# Patient Record
Sex: MALE | Race: WHITE | NOT HISPANIC OR LATINO | Employment: FULL TIME | URBAN - METROPOLITAN AREA
[De-identification: names, ages, dates, MRNs, and addresses within clinical notes are randomized per-mention and may not be internally consistent; named-entity substitution may affect disease eponyms.]

---

## 2023-02-28 LAB — PROSTATE SPECIFIC AG (NG/ML) IN SER/PLAS: 2.8 NG/ML (ref 0–4)

## 2023-03-22 ENCOUNTER — PATIENT OUTREACH (OUTPATIENT)
Dept: CARE COORDINATION | Facility: CLINIC | Age: 63
End: 2023-03-22

## 2023-04-18 ENCOUNTER — OFFICE VISIT (OUTPATIENT)
Dept: PRIMARY CARE | Facility: CLINIC | Age: 63
End: 2023-04-18
Payer: COMMERCIAL

## 2023-04-18 VITALS
TEMPERATURE: 98 F | HEIGHT: 71 IN | OXYGEN SATURATION: 96 % | DIASTOLIC BLOOD PRESSURE: 62 MMHG | SYSTOLIC BLOOD PRESSURE: 122 MMHG | WEIGHT: 315 LBS | HEART RATE: 64 BPM | BODY MASS INDEX: 44.1 KG/M2 | RESPIRATION RATE: 18 BRPM

## 2023-04-18 DIAGNOSIS — K21.9 GASTROESOPHAGEAL REFLUX DISEASE, UNSPECIFIED WHETHER ESOPHAGITIS PRESENT: ICD-10-CM

## 2023-04-18 DIAGNOSIS — E78.5 HYPERLIPIDEMIA, UNSPECIFIED HYPERLIPIDEMIA TYPE: ICD-10-CM

## 2023-04-18 DIAGNOSIS — G47.33 OSA ON CPAP: ICD-10-CM

## 2023-04-18 DIAGNOSIS — F32.A ANXIETY AND DEPRESSION: ICD-10-CM

## 2023-04-18 DIAGNOSIS — F41.9 ANXIETY AND DEPRESSION: ICD-10-CM

## 2023-04-18 DIAGNOSIS — Z79.4 TYPE 2 DIABETES MELLITUS WITH DIABETIC POLYNEUROPATHY, WITH LONG-TERM CURRENT USE OF INSULIN (MULTI): Primary | ICD-10-CM

## 2023-04-18 DIAGNOSIS — I48.91 ATRIAL FIBRILLATION, UNSPECIFIED TYPE (MULTI): ICD-10-CM

## 2023-04-18 DIAGNOSIS — E66.01 MORBID OBESITY (MULTI): ICD-10-CM

## 2023-04-18 DIAGNOSIS — I10 HYPERTENSION, UNSPECIFIED TYPE: ICD-10-CM

## 2023-04-18 DIAGNOSIS — E11.42 TYPE 2 DIABETES MELLITUS WITH DIABETIC POLYNEUROPATHY, WITH LONG-TERM CURRENT USE OF INSULIN (MULTI): Primary | ICD-10-CM

## 2023-04-18 PROBLEM — E61.1 IRON DEFICIENCY: Status: ACTIVE | Noted: 2023-04-18

## 2023-04-18 PROBLEM — D64.9 ANEMIA: Status: ACTIVE | Noted: 2023-04-18

## 2023-04-18 PROBLEM — E55.9 VITAMIN D DEFICIENCY: Status: ACTIVE | Noted: 2023-04-18

## 2023-04-18 PROBLEM — E11.9 T2DM (TYPE 2 DIABETES MELLITUS) (MULTI): Status: ACTIVE | Noted: 2023-04-18

## 2023-04-18 PROBLEM — Q53.9 UNDESCENDED TESTICLE: Status: ACTIVE | Noted: 2023-04-18

## 2023-04-18 PROBLEM — I50.9 CHF (CONGESTIVE HEART FAILURE) (MULTI): Status: ACTIVE | Noted: 2023-04-18

## 2023-04-18 PROBLEM — M79.89 MASS OF SOFT TISSUE OF NECK: Status: ACTIVE | Noted: 2023-04-18

## 2023-04-18 PROBLEM — Z98.84 STATUS POST GASTRIC BYPASS FOR OBESITY: Status: ACTIVE | Noted: 2023-04-18

## 2023-04-18 PROBLEM — M79.89 MASS OF SOFT TISSUE OF NECK: Status: RESOLVED | Noted: 2023-04-18 | Resolved: 2023-04-18

## 2023-04-18 PROBLEM — E87.6 HYPOKALEMIA: Status: ACTIVE | Noted: 2023-04-18

## 2023-04-18 PROBLEM — R79.89 ELEVATED PARATHYROID HORMONE: Status: ACTIVE | Noted: 2023-04-18

## 2023-04-18 PROCEDURE — 3078F DIAST BP <80 MM HG: CPT | Performed by: FAMILY MEDICINE

## 2023-04-18 PROCEDURE — 99214 OFFICE O/P EST MOD 30 MIN: CPT | Performed by: FAMILY MEDICINE

## 2023-04-18 PROCEDURE — 3074F SYST BP LT 130 MM HG: CPT | Performed by: FAMILY MEDICINE

## 2023-04-18 PROCEDURE — 4010F ACE/ARB THERAPY RXD/TAKEN: CPT | Performed by: FAMILY MEDICINE

## 2023-04-18 PROCEDURE — 1036F TOBACCO NON-USER: CPT | Performed by: FAMILY MEDICINE

## 2023-04-18 RX ORDER — TORSEMIDE 20 MG/1
60 TABLET ORAL DAILY
Qty: 270 TABLET | Refills: 3 | Status: SHIPPED | COMMUNITY
Start: 2023-04-18 | End: 2023-07-31 | Stop reason: SDUPTHER

## 2023-04-18 RX ORDER — METFORMIN HYDROCHLORIDE 1000 MG/1
TABLET ORAL
COMMUNITY
Start: 2019-09-06 | End: 2023-07-31

## 2023-04-18 RX ORDER — WARFARIN SODIUM 5 MG/1
TABLET ORAL
COMMUNITY
Start: 2023-04-11

## 2023-04-18 RX ORDER — SOTALOL HYDROCHLORIDE 80 MG/1
40 TABLET ORAL 2 TIMES DAILY
COMMUNITY
Start: 2023-04-12 | End: 2024-04-01 | Stop reason: ALTCHOICE

## 2023-04-18 RX ORDER — SITAGLIPTIN 100 MG/1
100 TABLET, FILM COATED ORAL DAILY
COMMUNITY
Start: 2021-07-16 | End: 2024-01-30 | Stop reason: ENTERED-IN-ERROR

## 2023-04-18 RX ORDER — ACETAMINOPHEN 160 MG/5ML
200 SUSPENSION, ORAL (FINAL DOSE FORM) ORAL DAILY
COMMUNITY

## 2023-04-18 RX ORDER — TORSEMIDE 20 MG/1
20 TABLET ORAL DAILY
COMMUNITY
Start: 2019-09-06 | End: 2023-04-18 | Stop reason: DRUGHIGH

## 2023-04-18 RX ORDER — LISINOPRIL 20 MG/1
TABLET ORAL
COMMUNITY
Start: 2023-03-11 | End: 2023-07-31 | Stop reason: SDUPTHER

## 2023-04-18 RX ORDER — SPIRONOLACTONE 25 MG/1
0.5 TABLET ORAL DAILY
COMMUNITY
Start: 2019-09-06 | End: 2023-07-31 | Stop reason: DRUGHIGH

## 2023-04-18 RX ORDER — SIMVASTATIN 40 MG/1
40 TABLET, FILM COATED ORAL NIGHTLY
COMMUNITY
End: 2023-12-15

## 2023-04-18 RX ORDER — ASCORBIC ACID 500 MG
TABLET,CHEWABLE ORAL
COMMUNITY
End: 2023-12-15

## 2023-04-18 RX ORDER — PEDI MULTIVIT NO.25/FOLIC ACID 300 MCG
TABLET,CHEWABLE ORAL
COMMUNITY
End: 2023-12-15

## 2023-04-18 RX ORDER — INSULIN GLARGINE 100 [IU]/ML
34 INJECTION, SOLUTION SUBCUTANEOUS NIGHTLY
COMMUNITY
Start: 2021-07-13 | End: 2023-10-09 | Stop reason: SDUPTHER

## 2023-04-18 RX ORDER — PANTOPRAZOLE SODIUM 40 MG/1
1 TABLET, DELAYED RELEASE ORAL DAILY
COMMUNITY
Start: 2019-08-19 | End: 2023-04-25

## 2023-04-18 RX ORDER — EMPAGLIFLOZIN 25 MG/1
25 TABLET, FILM COATED ORAL DAILY
COMMUNITY
Start: 2019-09-06 | End: 2023-06-15

## 2023-04-18 RX ORDER — POTASSIUM CHLORIDE 750 MG/1
TABLET, EXTENDED RELEASE ORAL
COMMUNITY
Start: 2019-10-16 | End: 2023-05-03

## 2023-04-18 RX ORDER — FLUOXETINE HYDROCHLORIDE 40 MG/1
80 CAPSULE ORAL DAILY
COMMUNITY

## 2023-04-18 ASSESSMENT — ENCOUNTER SYMPTOMS
SHORTNESS OF BREATH: 0
HEADACHES: 0
DIZZINESS: 0

## 2023-04-18 NOTE — PROGRESS NOTES
"Subjective     Allen Sierra is a 62 y.o. male who presents for Hospital Follow-up (Heart cath and maze. Feeling okay, still a little tender from the surgery. ).    HPI     Pt is here today for [facility] follow up s/p convergent MAZE procedure on 3/17/23 with Dr. Ramos, [facility] cardio.  He recently had outpatient follow up with EP NP Erendira Rivera on 3/29 and was found to be in atrial fibrillation.  He is on coumadin due to CHADS VASc score of 3.  He has CHF, on lisinopril, aldactone, jardiance.  He is on sotolol for rhythm due to his atrial fib which was started in Jan 2023.  Stage II convergent MAZE planned but not scheduled yet.  He goes to coumadin clinic through [facility].     He will be seeing ENT and audiology tomorrow.  He recently had COVID in Dec. 2022 , then sinus infection which was treated.  His ears have been bothering him since COVID infection. He feels pressure sensation both ears,right worse than left.       Review of Systems   Eyes:  Negative for visual disturbance.   Respiratory:  Negative for shortness of breath.    Cardiovascular:  Negative for chest pain.   Neurological:  Negative for dizziness and headaches.       Objective     Vitals:    04/18/23 1014   BP: 122/62   Pulse: 64   Resp: 18   Temp: 36.7 °C (98 °F)   TempSrc: Temporal   SpO2: 96%   Weight: (!) 154 kg (340 lb)   Height: 1.803 m (5' 11\")        Current Outpatient Medications   Medication Instructions    ascorbic acid (Vitamin C) 500 mg chewable tablet oral    CALCIUM CARBONATE-VITAMIN D3 ORAL oral    coenzyme Q-10 200 mg, oral, Daily    FLUoxetine (PROZAC) 40 mg, oral, 2 times daily    Januvia 100 mg, oral, Daily    Jardiance 25 mg, oral, Daily    Lantus Solostar U-100 Insulin 34 Units, subcutaneous, Nightly    lisinopril 20 mg tablet     metFORMIN (Glucophage) 1,000 mg tablet metformin 1,000 mg tablet    pantoprazole (ProtoNix) 40 mg EC tablet 1 tablet, oral, Daily    pedi multivit no.25-folic acid (Flintstones Multivitamin) 300 mcg " tablet,chewable oral    potassium chloride CR 10 mEq ER tablet     simvastatin (ZOCOR) 40 mg, oral, Nightly    sotalol (BETAPACE) 40 mg, oral, 2 times daily    spironolactone (ALDACTONE) 25 mg, oral, Daily    torsemide (DEMADEX) 60 mg, oral, Daily    warfarin (COUMADIN) 5 mg, oral        Past Surgical History:   Procedure Laterality Date    OTHER SURGICAL HISTORY  06/25/2019    Rotator cuff repair    OTHER SURGICAL HISTORY  06/25/2019    Hernia repair    OTHER SURGICAL HISTORY  09/27/2021    Colonoscopy    OTHER SURGICAL HISTORY  09/27/2021    Ablation    OTHER SURGICAL HISTORY  11/04/2021    Gastric bypass surgery        Social History     Tobacco Use    Smoking status: Never    Smokeless tobacco: Never        No family history on file.     Immunization History   Administered Date(s) Administered    Pfizer Gray Cap SARS-CoV-2 05/15/2022    Pfizer Purple Cap SARS-CoV-2 02/04/2021, 02/26/2021, 10/18/2021    Pfizer Sars-cov-2 Bivalent 30 mcg/0.3 mL 10/11/2022        Physical Exam  Vitals reviewed.   Constitutional:       General: He is not in acute distress.     Appearance: Normal appearance. He is obese. He is not ill-appearing.   HENT:      Head: Normocephalic and atraumatic.      Right Ear: Tympanic membrane, ear canal and external ear normal.      Left Ear: Tympanic membrane, ear canal and external ear normal.      Nose: Nose normal.      Mouth/Throat:      Mouth: Mucous membranes are moist.      Pharynx: No oropharyngeal exudate or posterior oropharyngeal erythema.   Eyes:      Extraocular Movements: Extraocular movements intact.      Pupils: Pupils are equal, round, and reactive to light.   Neck:      Thyroid: No thyroid mass or thyromegaly.   Cardiovascular:      Rate and Rhythm: Normal rate and regular rhythm.      Heart sounds: No murmur heard.  Pulmonary:      Effort: No respiratory distress.      Breath sounds: Normal breath sounds. No wheezing, rhonchi or rales.   Abdominal:      General: Abdomen is flat.  There is no distension.      Palpations: Abdomen is soft. There is no mass.      Tenderness: There is no abdominal tenderness.   Musculoskeletal:         General: Normal range of motion.   Lymphadenopathy:      Cervical: No cervical adenopathy.   Skin:     General: Skin is warm and dry.      Findings: No lesion or rash.      Comments: Surgical scars noted    Neurological:      General: No focal deficit present.      Mental Status: He is alert and oriented to person, place, and time. Mental status is at baseline.      Gait: Gait normal.   Psychiatric:         Mood and Affect: Mood normal.         Behavior: Behavior normal.         Problem List Items Addressed This Visit       Anxiety and depression    Atrial fibrillation (CMS/HCC)    Relevant Medications    sotalol (Betapace) 80 mg tablet    T2DM (type 2 diabetes mellitus) (CMS/HCC) - Primary    GERD (gastroesophageal reflux disease)    HLD (hyperlipidemia)    HTN (hypertension)    Morbid obesity (CMS/HCC)    RENÉ on CPAP       Assessment/Plan     HFU - s/p MAZE procedure on 3/17/23 with [facility] cardio, continue routine follow up with [facility] cardio as indicated     Hx of CHF - euvolemic, no trouble breathing.      Hx of atrial fibrillation - not currently in atrial fib.  Pt sees [facility] cardio , on coumadin.      Pt sees [facility] cardio, EP, coumadin clinic    Colon screening - utd , [facility]    Follow up 3 months or sooner if needed

## 2023-04-24 DIAGNOSIS — K21.9 GASTROESOPHAGEAL REFLUX DISEASE, UNSPECIFIED WHETHER ESOPHAGITIS PRESENT: Primary | ICD-10-CM

## 2023-04-25 RX ORDER — PANTOPRAZOLE SODIUM 40 MG/1
TABLET, DELAYED RELEASE ORAL
Qty: 90 TABLET | Refills: 3 | Status: SHIPPED | OUTPATIENT
Start: 2023-04-25 | End: 2023-07-31 | Stop reason: SDUPTHER

## 2023-05-03 DIAGNOSIS — E87.6 HYPOKALEMIA: Primary | ICD-10-CM

## 2023-05-03 RX ORDER — POTASSIUM CHLORIDE 750 MG/1
TABLET, EXTENDED RELEASE ORAL
Qty: 360 TABLET | Refills: 3 | Status: SHIPPED | OUTPATIENT
Start: 2023-05-03 | End: 2023-07-31 | Stop reason: SDUPTHER

## 2023-06-15 ENCOUNTER — OFFICE VISIT (OUTPATIENT)
Dept: PRIMARY CARE | Facility: CLINIC | Age: 63
End: 2023-06-15
Payer: COMMERCIAL

## 2023-06-15 VITALS
HEIGHT: 71 IN | RESPIRATION RATE: 18 BRPM | TEMPERATURE: 97.6 F | BODY MASS INDEX: 44.1 KG/M2 | WEIGHT: 315 LBS | OXYGEN SATURATION: 98 % | DIASTOLIC BLOOD PRESSURE: 78 MMHG | SYSTOLIC BLOOD PRESSURE: 116 MMHG | HEART RATE: 111 BPM

## 2023-06-15 DIAGNOSIS — I50.9 CONGESTIVE HEART FAILURE, UNSPECIFIED HF CHRONICITY, UNSPECIFIED HEART FAILURE TYPE (MULTI): ICD-10-CM

## 2023-06-15 DIAGNOSIS — R39.9 UTI SYMPTOMS: ICD-10-CM

## 2023-06-15 DIAGNOSIS — R39.9 UTI SYMPTOMS: Primary | ICD-10-CM

## 2023-06-15 LAB
POC APPEARANCE, URINE: CLEAR
POC BILIRUBIN, URINE: NEGATIVE
POC BLOOD, URINE: NEGATIVE
POC COLOR, URINE: YELLOW
POC GLUCOSE, URINE: NEGATIVE MG/DL
POC KETONES, URINE: NEGATIVE MG/DL
POC LEUKOCYTES, URINE: NEGATIVE
POC NITRITE,URINE: NEGATIVE
POC PH, URINE: 6.5 PH
POC PROTEIN, URINE: NEGATIVE MG/DL
POC SPECIFIC GRAVITY, URINE: 1.01
POC UROBILINOGEN, URINE: 0.2 EU/DL

## 2023-06-15 PROCEDURE — 1036F TOBACCO NON-USER: CPT | Performed by: FAMILY MEDICINE

## 2023-06-15 PROCEDURE — 4010F ACE/ARB THERAPY RXD/TAKEN: CPT | Performed by: FAMILY MEDICINE

## 2023-06-15 PROCEDURE — 3074F SYST BP LT 130 MM HG: CPT | Performed by: FAMILY MEDICINE

## 2023-06-15 PROCEDURE — 81002 URINALYSIS NONAUTO W/O SCOPE: CPT | Performed by: FAMILY MEDICINE

## 2023-06-15 PROCEDURE — 3078F DIAST BP <80 MM HG: CPT | Performed by: FAMILY MEDICINE

## 2023-06-15 PROCEDURE — 99213 OFFICE O/P EST LOW 20 MIN: CPT | Performed by: FAMILY MEDICINE

## 2023-06-15 PROCEDURE — 87086 URINE CULTURE/COLONY COUNT: CPT

## 2023-06-15 RX ORDER — NITROFURANTOIN (MACROCRYSTALS) 100 MG/1
100 CAPSULE ORAL 2 TIMES DAILY
Qty: 14 CAPSULE | Refills: 0 | Status: SHIPPED | OUTPATIENT
Start: 2023-06-15 | End: 2023-06-15 | Stop reason: SDUPTHER

## 2023-06-15 RX ORDER — NITROFURANTOIN (MACROCRYSTALS) 100 MG/1
100 CAPSULE ORAL 2 TIMES DAILY
Qty: 14 CAPSULE | Refills: 0 | Status: SHIPPED | OUTPATIENT
Start: 2023-06-15 | End: 2023-06-22

## 2023-06-15 NOTE — PROGRESS NOTES
"Subjective     Allen Sierra is a 62 y.o. male who presents for UTI.    UTI          Pt recently stopped his jardiance last week due to recurrent UTI.  He reports 2-3 UTIs in the past six months. I have not treated UTIs for patient.  He has not had his UTI symptoms evaluated by a medical professional but does report dysuria, tingling prior to urinating, urgency of urination, hesitency of urination - the symptoms usually last for a few days then improve without treatment however his most recent UTI symptoms started six days ago (dysuria, increased urinary frequency, urgency, hesitency).  No fevers. No flank or low back pain.  No hx of kidney infections.  No blood in urine. No hx of kidney stones.       Pt sees [facility], pt called her office to let her know that he stopped the jardiance due to UTI symptoms.          Review of Systems    Objective     Vitals:    06/15/23 1110   BP: 116/78   BP Location: Left arm   Patient Position: Sitting   Pulse: (!) 111   Resp: 18   Temp: 36.4 °C (97.6 °F)   TempSrc: Temporal   SpO2: 98%   Weight: (!) 151 kg (333 lb)   Height: 1.803 m (5' 11\")        Current Outpatient Medications   Medication Instructions    ascorbic acid (Vitamin C) 500 mg chewable tablet oral    CALCIUM CARBONATE-VITAMIN D3 ORAL oral    coenzyme Q-10 200 mg, oral, Daily    FLUoxetine (PROZAC) 40 mg, oral, 2 times daily    Januvia 100 mg, oral, Daily    Lantus Solostar U-100 Insulin 34 Units, subcutaneous, Nightly    lisinopril 20 mg tablet     metFORMIN (Glucophage) 1,000 mg tablet metformin 1,000 mg tablet    nitrofurantoin (MACRODANTIN) 100 mg, oral, 2 times daily    pantoprazole (ProtoNix) 40 mg EC tablet TAKE 1 TABLET DAILY    pedi multivit no.25-folic acid (Flintstones Multivitamin) 300 mcg tablet,chewable oral    potassium chloride CR 10 mEq ER tablet TAKE 2 TABLETS TWICE A DAY    simvastatin (ZOCOR) 40 mg, oral, Nightly    sotalol (BETAPACE) 40 mg, oral, 2 times daily    spironolactone (ALDACTONE) 25 mg, " oral, Daily    torsemide (DEMADEX) 60 mg, oral, Daily    warfarin (COUMADIN) 5 mg, oral        Past Surgical History:   Procedure Laterality Date    CARDIAC SURGERY      MAZE procedure - 3/2023 -[facility] cardio    OTHER SURGICAL HISTORY  06/25/2019    Rotator cuff repair    OTHER SURGICAL HISTORY  06/25/2019    Hernia repair    OTHER SURGICAL HISTORY  09/27/2021    Colonoscopy    OTHER SURGICAL HISTORY  09/27/2021    Ablation    OTHER SURGICAL HISTORY  11/04/2021    Gastric bypass surgery        Social History     Tobacco Use    Smoking status: Never    Smokeless tobacco: Never        No family history on file.     Immunization History   Administered Date(s) Administered    Pfizer Gray Cap SARS-CoV-2 05/15/2022    Pfizer Purple Cap SARS-CoV-2 02/04/2021, 02/26/2021, 10/18/2021    Pfizer Sars-cov-2 Bivalent 30 mcg/0.3 mL 10/11/2022        Physical Exam  Vitals reviewed.   Constitutional:       General: He is not in acute distress.     Appearance: Normal appearance. He is obese. He is not ill-appearing.   HENT:      Head: Normocephalic and atraumatic.      Mouth/Throat:      Pharynx: No oropharyngeal exudate or posterior oropharyngeal erythema.   Neck:      Thyroid: No thyroid mass or thyromegaly.   Cardiovascular:      Rate and Rhythm: Normal rate. Rhythm irregular.      Heart sounds: No murmur heard.     Comments: Hx of atrial fib  Pulmonary:      Effort: No respiratory distress.      Breath sounds: Normal breath sounds. No wheezing, rhonchi or rales.   Abdominal:      General: Abdomen is flat. There is no distension.      Palpations: Abdomen is soft. There is no mass.      Tenderness: There is no abdominal tenderness.   Musculoskeletal:         General: Normal range of motion.   Lymphadenopathy:      Cervical: No cervical adenopathy.   Skin:     General: Skin is warm and dry.      Findings: No lesion or rash.   Neurological:      Mental Status: He is alert and oriented to person, place, and time. Mental status is at  baseline.   Psychiatric:         Mood and Affect: Mood normal.         Behavior: Behavior normal.         Problem List Items Addressed This Visit       CHF (congestive heart failure) (CMS/[facility])    UTI symptoms - Primary    Relevant Medications    nitrofurantoin (Macrodantin) 100 mg capsule    Other Relevant Orders    POCT UA (nonautomated w/o microscopy) manually resulted (Completed)    Urine Culture       Assessment/Plan     UTI symptoms - UA dip negative today however clinically sounds like UTI - higher risk due to DM II and jardiance use - I will send urine culture out and treat with macrobid 100 mg BID x 7 days, The goals of therapy, medication dose, frequency and potential side effects were discussed with patient today.  The patient is agreeable to taking the medication as prescribed.       Genital yeast rash from a few months ago has improved with miconazole powder     Follow up if no improvement or if symptoms worsen

## 2023-06-16 LAB — URINE CULTURE: NORMAL

## 2023-07-31 ENCOUNTER — LAB (OUTPATIENT)
Dept: LAB | Facility: LAB | Age: 63
End: 2023-07-31
Payer: COMMERCIAL

## 2023-07-31 ENCOUNTER — OFFICE VISIT (OUTPATIENT)
Dept: PRIMARY CARE | Facility: CLINIC | Age: 63
End: 2023-07-31
Payer: COMMERCIAL

## 2023-07-31 VITALS
SYSTOLIC BLOOD PRESSURE: 126 MMHG | RESPIRATION RATE: 18 BRPM | HEIGHT: 71 IN | TEMPERATURE: 97.2 F | WEIGHT: 315 LBS | OXYGEN SATURATION: 96 % | DIASTOLIC BLOOD PRESSURE: 82 MMHG | BODY MASS INDEX: 44.1 KG/M2 | HEART RATE: 105 BPM

## 2023-07-31 DIAGNOSIS — R33.9 URINARY RETENTION: ICD-10-CM

## 2023-07-31 DIAGNOSIS — D64.9 ANEMIA, UNSPECIFIED TYPE: ICD-10-CM

## 2023-07-31 DIAGNOSIS — Z00.00 HEALTH CARE MAINTENANCE: Primary | ICD-10-CM

## 2023-07-31 DIAGNOSIS — E66.01 MORBID OBESITY (MULTI): ICD-10-CM

## 2023-07-31 DIAGNOSIS — E55.9 VITAMIN D DEFICIENCY: ICD-10-CM

## 2023-07-31 DIAGNOSIS — K21.9 GASTROESOPHAGEAL REFLUX DISEASE, UNSPECIFIED WHETHER ESOPHAGITIS PRESENT: ICD-10-CM

## 2023-07-31 DIAGNOSIS — E11.42 TYPE 2 DIABETES MELLITUS WITH DIABETIC POLYNEUROPATHY, WITH LONG-TERM CURRENT USE OF INSULIN (MULTI): ICD-10-CM

## 2023-07-31 DIAGNOSIS — E78.5 HYPERLIPIDEMIA, UNSPECIFIED HYPERLIPIDEMIA TYPE: ICD-10-CM

## 2023-07-31 DIAGNOSIS — I10 HYPERTENSION, ESSENTIAL, BENIGN: ICD-10-CM

## 2023-07-31 DIAGNOSIS — I48.91 ATRIAL FIBRILLATION, UNSPECIFIED TYPE (MULTI): ICD-10-CM

## 2023-07-31 DIAGNOSIS — I50.9 CONGESTIVE HEART FAILURE, UNSPECIFIED HF CHRONICITY, UNSPECIFIED HEART FAILURE TYPE (MULTI): ICD-10-CM

## 2023-07-31 DIAGNOSIS — Z98.84 STATUS POST GASTRIC BYPASS FOR OBESITY: ICD-10-CM

## 2023-07-31 DIAGNOSIS — Z79.4 TYPE 2 DIABETES MELLITUS WITH DIABETIC POLYNEUROPATHY, WITH LONG-TERM CURRENT USE OF INSULIN (MULTI): ICD-10-CM

## 2023-07-31 DIAGNOSIS — Z00.00 HEALTH CARE MAINTENANCE: ICD-10-CM

## 2023-07-31 DIAGNOSIS — G47.33 OSA ON CPAP: ICD-10-CM

## 2023-07-31 DIAGNOSIS — E87.6 HYPOKALEMIA: ICD-10-CM

## 2023-07-31 PROBLEM — R39.9 UTI SYMPTOMS: Status: RESOLVED | Noted: 2023-06-15 | Resolved: 2023-07-31

## 2023-07-31 LAB
ALANINE AMINOTRANSFERASE (SGPT) (U/L) IN SER/PLAS: 25 U/L (ref 10–52)
ALBUMIN (G/DL) IN SER/PLAS: 3.6 G/DL (ref 3.4–5)
ALKALINE PHOSPHATASE (U/L) IN SER/PLAS: 54 U/L (ref 33–136)
ANION GAP IN SER/PLAS: 13 MMOL/L (ref 10–20)
ASPARTATE AMINOTRANSFERASE (SGOT) (U/L) IN SER/PLAS: 23 U/L (ref 9–39)
BASOPHILS (10*3/UL) IN BLOOD BY AUTOMATED COUNT: 0.06 X10E9/L (ref 0–0.1)
BASOPHILS/100 LEUKOCYTES IN BLOOD BY AUTOMATED COUNT: 1.1 % (ref 0–2)
BILIRUBIN TOTAL (MG/DL) IN SER/PLAS: 1.5 MG/DL (ref 0–1.2)
CALCIDIOL (25 OH VITAMIN D3) (NG/ML) IN SER/PLAS: 31 NG/ML
CALCIUM (MG/DL) IN SER/PLAS: 8.7 MG/DL (ref 8.6–10.3)
CARBON DIOXIDE, TOTAL (MMOL/L) IN SER/PLAS: 32 MMOL/L (ref 21–32)
CHLORIDE (MMOL/L) IN SER/PLAS: 104 MMOL/L (ref 98–107)
CHOLESTEROL (MG/DL) IN SER/PLAS: 96 MG/DL (ref 0–199)
CHOLESTEROL IN HDL (MG/DL) IN SER/PLAS: 42.5 MG/DL
CHOLESTEROL/HDL RATIO: 2.3
CREATININE (MG/DL) IN SER/PLAS: 0.94 MG/DL (ref 0.5–1.3)
EOSINOPHILS (10*3/UL) IN BLOOD BY AUTOMATED COUNT: 0.17 X10E9/L (ref 0–0.7)
EOSINOPHILS/100 LEUKOCYTES IN BLOOD BY AUTOMATED COUNT: 3 % (ref 0–6)
ERYTHROCYTE DISTRIBUTION WIDTH (RATIO) BY AUTOMATED COUNT: 18.6 % (ref 11.5–14.5)
ERYTHROCYTE MEAN CORPUSCULAR HEMOGLOBIN CONCENTRATION (G/DL) BY AUTOMATED: 30.9 G/DL (ref 32–36)
ERYTHROCYTE MEAN CORPUSCULAR VOLUME (FL) BY AUTOMATED COUNT: 88 FL (ref 80–100)
ERYTHROCYTES (10*6/UL) IN BLOOD BY AUTOMATED COUNT: 4.43 X10E12/L (ref 4.5–5.9)
GFR MALE: >90 ML/MIN/1.73M2
GLUCOSE (MG/DL) IN SER/PLAS: 96 MG/DL (ref 74–99)
HEMATOCRIT (%) IN BLOOD BY AUTOMATED COUNT: 39.2 % (ref 41–52)
HEMOGLOBIN (G/DL) IN BLOOD: 12.1 G/DL (ref 13.5–17.5)
IMMATURE GRANULOCYTES/100 LEUKOCYTES IN BLOOD BY AUTOMATED COUNT: 0.2 % (ref 0–0.9)
LDL: 40 MG/DL (ref 0–99)
LEUKOCYTES (10*3/UL) IN BLOOD BY AUTOMATED COUNT: 5.7 X10E9/L (ref 4.4–11.3)
LYMPHOCYTES (10*3/UL) IN BLOOD BY AUTOMATED COUNT: 1.36 X10E9/L (ref 1.2–4.8)
LYMPHOCYTES/100 LEUKOCYTES IN BLOOD BY AUTOMATED COUNT: 23.8 % (ref 13–44)
MONOCYTES (10*3/UL) IN BLOOD BY AUTOMATED COUNT: 0.52 X10E9/L (ref 0.1–1)
MONOCYTES/100 LEUKOCYTES IN BLOOD BY AUTOMATED COUNT: 9.1 % (ref 2–10)
NEUTROPHILS (10*3/UL) IN BLOOD BY AUTOMATED COUNT: 3.59 X10E9/L (ref 1.2–7.7)
NEUTROPHILS/100 LEUKOCYTES IN BLOOD BY AUTOMATED COUNT: 62.8 % (ref 40–80)
PLATELETS (10*3/UL) IN BLOOD AUTOMATED COUNT: 238 X10E9/L (ref 150–450)
POC APPEARANCE, URINE: CLEAR
POC BILIRUBIN, URINE: NEGATIVE
POC BLOOD, URINE: NEGATIVE
POC COLOR, URINE: YELLOW
POC GLUCOSE, URINE: NEGATIVE MG/DL
POC KETONES, URINE: NEGATIVE MG/DL
POC LEUKOCYTES, URINE: NEGATIVE
POC NITRITE,URINE: NEGATIVE
POC PH, URINE: 6 PH
POC PROTEIN, URINE: NEGATIVE MG/DL
POC SPECIFIC GRAVITY, URINE: 1.01
POC UROBILINOGEN, URINE: 0.2 EU/DL
POTASSIUM (MMOL/L) IN SER/PLAS: 4 MMOL/L (ref 3.5–5.3)
PROSTATE SPECIFIC AG (NG/ML) IN SER/PLAS: 3.45 NG/ML (ref 0–4)
PROTEIN TOTAL: 5.8 G/DL (ref 6.4–8.2)
SODIUM (MMOL/L) IN SER/PLAS: 145 MMOL/L (ref 136–145)
TRIGLYCERIDE (MG/DL) IN SER/PLAS: 70 MG/DL (ref 0–149)
UREA NITROGEN (MG/DL) IN SER/PLAS: 19 MG/DL (ref 6–23)
VLDL: 14 MG/DL (ref 0–40)

## 2023-07-31 PROCEDURE — 81002 URINALYSIS NONAUTO W/O SCOPE: CPT | Performed by: FAMILY MEDICINE

## 2023-07-31 PROCEDURE — 36415 COLL VENOUS BLD VENIPUNCTURE: CPT

## 2023-07-31 PROCEDURE — 90471 IMMUNIZATION ADMIN: CPT | Performed by: FAMILY MEDICINE

## 2023-07-31 PROCEDURE — 3079F DIAST BP 80-89 MM HG: CPT | Performed by: FAMILY MEDICINE

## 2023-07-31 PROCEDURE — 99396 PREV VISIT EST AGE 40-64: CPT | Performed by: FAMILY MEDICINE

## 2023-07-31 PROCEDURE — 3074F SYST BP LT 130 MM HG: CPT | Performed by: FAMILY MEDICINE

## 2023-07-31 PROCEDURE — 80061 LIPID PANEL: CPT

## 2023-07-31 PROCEDURE — 85025 COMPLETE CBC W/AUTO DIFF WBC: CPT

## 2023-07-31 PROCEDURE — 1036F TOBACCO NON-USER: CPT | Performed by: FAMILY MEDICINE

## 2023-07-31 PROCEDURE — 84153 ASSAY OF PSA TOTAL: CPT

## 2023-07-31 PROCEDURE — 4010F ACE/ARB THERAPY RXD/TAKEN: CPT | Performed by: FAMILY MEDICINE

## 2023-07-31 PROCEDURE — 82306 VITAMIN D 25 HYDROXY: CPT

## 2023-07-31 PROCEDURE — 80053 COMPREHEN METABOLIC PANEL: CPT

## 2023-07-31 PROCEDURE — 90715 TDAP VACCINE 7 YRS/> IM: CPT | Performed by: FAMILY MEDICINE

## 2023-07-31 RX ORDER — PANTOPRAZOLE SODIUM 40 MG/1
40 TABLET, DELAYED RELEASE ORAL DAILY
Qty: 90 TABLET | Refills: 3 | Status: SHIPPED | OUTPATIENT
Start: 2023-07-31 | End: 2024-04-08

## 2023-07-31 RX ORDER — TORSEMIDE 20 MG/1
60 TABLET ORAL DAILY
Qty: 270 TABLET | Refills: 3 | Status: SHIPPED | OUTPATIENT
Start: 2023-07-31 | End: 2023-12-15 | Stop reason: SDUPTHER

## 2023-07-31 RX ORDER — SPIRONOLACTONE 25 MG/1
50 TABLET ORAL DAILY
Qty: 90 TABLET | Refills: 3 | Status: SHIPPED | COMMUNITY
Start: 2023-07-31

## 2023-07-31 RX ORDER — METFORMIN HYDROCHLORIDE 500 MG/1
1000 TABLET, EXTENDED RELEASE ORAL 2 TIMES DAILY
COMMUNITY
End: 2024-01-09

## 2023-07-31 RX ORDER — POTASSIUM CHLORIDE 750 MG/1
20 TABLET, FILM COATED, EXTENDED RELEASE ORAL 2 TIMES DAILY
Qty: 360 TABLET | Refills: 3 | Status: SHIPPED | OUTPATIENT
Start: 2023-07-31 | End: 2023-12-15 | Stop reason: DRUGHIGH

## 2023-07-31 RX ORDER — LISINOPRIL 20 MG/1
20 TABLET ORAL DAILY
Qty: 90 TABLET | Refills: 3 | Status: SHIPPED | OUTPATIENT
Start: 2023-07-31

## 2023-07-31 ASSESSMENT — PATIENT HEALTH QUESTIONNAIRE - PHQ9
2. FEELING DOWN, DEPRESSED OR HOPELESS: NOT AT ALL
SUM OF ALL RESPONSES TO PHQ9 QUESTIONS 1 AND 2: 0
1. LITTLE INTEREST OR PLEASURE IN DOING THINGS: NOT AT ALL

## 2023-07-31 ASSESSMENT — ENCOUNTER SYMPTOMS
HEADACHES: 0
DIZZINESS: 0
DEPRESSION: 0
OCCASIONAL FEELINGS OF UNSTEADINESS: 0
SHORTNESS OF BREATH: 0
LOSS OF SENSATION IN FEET: 0

## 2023-07-31 NOTE — PROGRESS NOTES
"Subjective     Allen Sierra is a 63 y.o. male who presents for Annual Exam.    HPI     Pt has hx of atrial fib , hx of ablation however his cardiologist through [facility] wants him to get another ablation but his insurance will not cover it.  He has hx of CHF, his cardiologist increased his torsemide dosing due to increased LE edema.      Dr. Meghan Dubose - CHF [facility] cardio  Dr. Jones-Gordo - EP [facility] cardio  Dr. Fung -  endo - IDDM  Dr. Fuchs -  pulm - RENÉ (severe) on CPAP  Dr. Herndon - psychiatry - anxiety/depression  Dr. Gallegos -  urology - urinary retention    Review of Systems   Eyes:  Negative for visual disturbance.   Respiratory:  Negative for shortness of breath.    Cardiovascular:  Negative for chest pain.   Neurological:  Negative for dizziness and headaches.       Objective     Vitals:    07/31/23 0920   BP: 126/82   BP Location: Left arm   Patient Position: Sitting   Pulse: 105   Resp: 18   Temp: 36.2 °C (97.2 °F)   TempSrc: Temporal   SpO2: 96%   Weight: (!) 153 kg (337 lb)   Height: 1.803 m (5' 11\")        Current Outpatient Medications   Medication Instructions    ascorbic acid (Vitamin C) 500 mg chewable tablet oral    CALCIUM CARBONATE-VITAMIN D3 ORAL oral    coenzyme Q-10 200 mg, oral, Daily    FLUoxetine (PROZAC) 40 mg, oral, 2 times daily    Januvia 100 mg, oral, Daily    Lantus Solostar U-100 Insulin 34 Units, subcutaneous, Nightly    lisinopril 20 mg, oral, Daily    metFORMIN XR (Glucophage-XR) 500 mg 24 hr tablet Take 2 tablets (1,000 mg) by mouth 2 times a day.    pantoprazole (PROTONIX) 40 mg, oral, Daily, Do not crush, chew, or split.    pedi multivit no.25-folic acid (Flintstones Multivitamin) 300 mcg tablet,chewable oral    potassium chloride CR 10 mEq ER tablet 20 mEq, oral, 2 times daily, Do not crush, chew, or split.    simvastatin (ZOCOR) 40 mg, oral, Nightly    sotalol (BETAPACE) 40 mg, oral, 2 times daily    spironolactone (ALDACTONE) 25 mg, oral, Daily    torsemide " (DEMADEX) 60 mg, oral, Daily    warfarin (COUMADIN) 5 mg, oral        Past Surgical History:   Procedure Laterality Date    CARDIAC SURGERY      MAZE procedure - 3/2023 -[facility] cardio    OTHER SURGICAL HISTORY  06/25/2019    Rotator cuff repair    OTHER SURGICAL HISTORY  06/25/2019    Hernia repair    OTHER SURGICAL HISTORY  09/27/2021    Colonoscopy    OTHER SURGICAL HISTORY  09/27/2021    Ablation    OTHER SURGICAL HISTORY  11/04/2021    Gastric bypass surgery        Social History     Tobacco Use    Smoking status: Never    Smokeless tobacco: Never        No family history on file.     Immunization History   Administered Date(s) Administered    Flu vaccine (IIV4), preservative free *Check age/dose* 10/13/2018, 10/19/2019, 09/24/2020, 09/27/2021    Influenza, seasonal, injectable 10/08/2014    Pfizer COVID-19 vaccine, bivalent, age 12 years and older (30 mcg/0.3 mL) 10/11/2022    Pfizer Gray Cap SARS-CoV-2 05/15/2022    Pfizer Purple Cap SARS-CoV-2 02/04/2021, 02/26/2021, 10/18/2021    Pneumococcal polysaccharide vaccine, 23-valent, age 2 years and older (PNEUMOVAX 23) 12/17/2010    Tdap vaccine, age 10 years and older (BOOSTRIX) 01/01/2015, 07/31/2023    Zoster vaccine, recombinant, adult (SHINGRIX) 08/31/2018, 01/25/2019        Physical Exam  Vitals reviewed.   Constitutional:       General: He is not in acute distress.     Appearance: Normal appearance. He is obese. He is not ill-appearing.   HENT:      Head: Normocephalic and atraumatic.      Right Ear: Tympanic membrane, ear canal and external ear normal.      Left Ear: Tympanic membrane, ear canal and external ear normal.      Nose: Nose normal.      Mouth/Throat:      Mouth: Mucous membranes are moist.      Pharynx: No oropharyngeal exudate or posterior oropharyngeal erythema.   Eyes:      Conjunctiva/sclera: Conjunctivae normal.   Neck:      Thyroid: No thyroid mass or thyromegaly.   Cardiovascular:      Rate and Rhythm: Normal rate. Rhythm irregular.       Heart sounds: No murmur heard.  Pulmonary:      Effort: No respiratory distress.      Breath sounds: Normal breath sounds. No wheezing, rhonchi or rales.   Abdominal:      General: Abdomen is flat. There is no distension.      Palpations: Abdomen is soft. There is no mass.      Tenderness: There is no abdominal tenderness.   Musculoskeletal:         General: Normal range of motion.      Right lower le+ Edema present.      Left lower le+ Edema present.   Lymphadenopathy:      Cervical: No cervical adenopathy.   Skin:     General: Skin is warm and dry.      Findings: No lesion or rash.   Neurological:      Mental Status: He is alert and oriented to person, place, and time. Mental status is at baseline.   Psychiatric:         Mood and Affect: Mood normal.         Behavior: Behavior normal.         Problem List Items Addressed This Visit       Anemia    Atrial fibrillation (CMS/HCC)    Relevant Orders    CBC and Auto Differential    CHF (congestive heart failure) (CMS/HCC)    Relevant Medications    torsemide (Demadex) 20 mg tablet    T2DM (type 2 diabetes mellitus) (CMS/[facility])    Relevant Orders    Comprehensive Metabolic Panel    Lipid Panel    GERD (gastroesophageal reflux disease)    Relevant Medications    pantoprazole (ProtoNix) 40 mg EC tablet    HLD (hyperlipidemia)    Relevant Orders    Comprehensive Metabolic Panel    Lipid Panel    Hypertension, essential, benign    Relevant Medications    lisinopril 20 mg tablet    Other Relevant Orders    Comprehensive Metabolic Panel    Lipid Panel    CBC and Auto Differential    Hypokalemia    Relevant Medications    potassium chloride CR 10 mEq ER tablet    Morbid obesity (CMS/HCC)    RENÉ on CPAP    Status post gastric bypass for obesity    Vitamin D deficiency    Relevant Orders    Vitamin D, Total     Other Visit Diagnoses       Health care maintenance    -  Primary    Relevant Orders    POCT UA (nonautomated w/o microscopy) manually resulted (Completed)     Comprehensive Metabolic Panel    Lipid Panel    CBC and Auto Differential    Urinary retention        Relevant Orders    Prostate Specific Antigen            Assessment/Plan     Well Exam     Colon screening - Colonoscopy is up to date 2/24/21, repeat 5 years (2026)    Vaccines - utd    I recommend yearly flu vaccine and routine COVID vaccinations as indicated     Complete labs    Healthy diet, routine exercise was discussed with patient      Hx of atrial fib/CHF - sees [facility] EP and CHF specialist - goes to [facility] coumadin clinic.     Hx of RENÉ - severe - on CPAP - sees  pulm    Urinary retention - will be seeing  urology    Anxiety/depression - on prozac, sees psychiatry    Follow up in 4 months or sooner if needed

## 2023-08-09 DIAGNOSIS — D64.9 ANEMIA, UNSPECIFIED TYPE: ICD-10-CM

## 2023-08-09 DIAGNOSIS — R17 ELEVATED BILIRUBIN: Primary | ICD-10-CM

## 2023-08-09 DIAGNOSIS — Z98.84 STATUS POST GASTRIC BYPASS FOR OBESITY: ICD-10-CM

## 2023-09-27 PROBLEM — E66.01 OBESITY, CLASS III, BMI 40-49.9 (MORBID OBESITY) (MULTI): Status: ACTIVE | Noted: 2020-11-03

## 2023-09-27 PROBLEM — D50.9 IRON DEFICIENCY ANEMIA: Status: ACTIVE | Noted: 2021-11-18

## 2023-09-27 PROBLEM — R30.0 DYSURIA: Status: ACTIVE | Noted: 2023-09-27

## 2023-09-27 PROBLEM — E66.813 OBESITY, CLASS III, BMI 40-49.9 (MORBID OBESITY): Status: ACTIVE | Noted: 2020-11-03

## 2023-09-27 PROBLEM — E78.5 DYSLIPIDEMIA: Status: ACTIVE | Noted: 2023-09-27

## 2023-09-27 PROBLEM — F33.40 RECURRENT MAJOR DEPRESSIVE DISORDER, IN REMISSION (CMS-HCC): Status: ACTIVE | Noted: 2023-09-27

## 2023-09-27 PROBLEM — E87.0 HYPERNATREMIA: Status: ACTIVE | Noted: 2023-09-27

## 2023-09-27 PROBLEM — M10.9 GOUT: Status: ACTIVE | Noted: 2023-09-27

## 2023-09-27 PROBLEM — F41.9 ANXIETY: Status: ACTIVE | Noted: 2023-03-13

## 2023-09-27 PROBLEM — R97.20 INCREASED PROSTATE SPECIFIC ANTIGEN (PSA) VELOCITY: Status: ACTIVE | Noted: 2023-09-27

## 2023-09-27 RX ORDER — CARVEDILOL 25 MG/1
1 TABLET ORAL
COMMUNITY
End: 2023-12-15

## 2023-09-27 RX ORDER — SPIRONOLACTONE 25 MG/1
1 TABLET ORAL DAILY
COMMUNITY
Start: 2019-09-06 | End: 2023-12-15

## 2023-09-27 RX ORDER — SEMAGLUTIDE 0.68 MG/ML
INJECTION, SOLUTION SUBCUTANEOUS
COMMUNITY
Start: 2023-05-19 | End: 2023-10-09 | Stop reason: ALTCHOICE

## 2023-09-27 RX ORDER — INSULIN GLARGINE-YFGN 100 [IU]/ML
INJECTION, SOLUTION SUBCUTANEOUS
COMMUNITY
Start: 2023-07-07 | End: 2023-10-09 | Stop reason: ALTCHOICE

## 2023-09-27 RX ORDER — MICONAZOLE
POWDER (GRAM) MISCELLANEOUS 2 TIMES DAILY
COMMUNITY
Start: 2023-02-10 | End: 2024-01-30 | Stop reason: ENTERED-IN-ERROR

## 2023-09-27 RX ORDER — FEBUXOSTAT 80 MG/1
TABLET, FILM COATED ORAL
COMMUNITY
End: 2024-01-30 | Stop reason: ENTERED-IN-ERROR

## 2023-09-27 RX ORDER — PEN NEEDLE, DIABETIC 32GX 5/32"
NEEDLE, DISPOSABLE MISCELLANEOUS
COMMUNITY
Start: 2023-07-26 | End: 2024-01-30 | Stop reason: ENTERED-IN-ERROR

## 2023-09-27 RX ORDER — QUINAPRIL 20 MG/1
TABLET ORAL
COMMUNITY
End: 2023-12-15

## 2023-09-27 RX ORDER — LINAGLIPTIN 5 MG/1
1 TABLET, FILM COATED ORAL DAILY
COMMUNITY
End: 2024-01-30 | Stop reason: ENTERED-IN-ERROR

## 2023-09-27 RX ORDER — QUINAPRIL 40 MG/1
0.5 TABLET ORAL DAILY
COMMUNITY
Start: 2019-10-16 | End: 2023-12-15

## 2023-09-27 RX ORDER — LANCETS
EACH MISCELLANEOUS
COMMUNITY
End: 2024-01-30 | Stop reason: ENTERED-IN-ERROR

## 2023-09-27 RX ORDER — CARVEDILOL 12.5 MG/1
1 TABLET ORAL
COMMUNITY
End: 2024-01-30 | Stop reason: ENTERED-IN-ERROR

## 2023-09-27 RX ORDER — SIMVASTATIN 20 MG/1
20 TABLET, FILM COATED ORAL NIGHTLY
COMMUNITY
Start: 2019-09-06

## 2023-09-27 RX ORDER — AMIODARONE HYDROCHLORIDE 200 MG/1
1 TABLET ORAL DAILY
COMMUNITY
End: 2024-01-30 | Stop reason: ENTERED-IN-ERROR

## 2023-09-27 RX ORDER — ASCORBIC ACID 500 MG
500 TABLET ORAL DAILY
COMMUNITY

## 2023-09-27 RX ORDER — ERGOCALCIFEROL (VITAMIN D2) 50 MCG
2000 CAPSULE ORAL DAILY
COMMUNITY
End: 2024-01-30 | Stop reason: ENTERED-IN-ERROR

## 2023-09-27 RX ORDER — CALCIUM CARBONATE/VITAMIN D3 600MG-5MCG
1 TABLET ORAL DAILY
COMMUNITY
Start: 2015-10-05

## 2023-09-27 RX ORDER — WARFARIN 4 MG/1
TABLET ORAL
COMMUNITY
End: 2024-01-30 | Stop reason: ENTERED-IN-ERROR

## 2023-09-27 RX ORDER — SEMAGLUTIDE 1.34 MG/ML
0.25 INJECTION, SOLUTION SUBCUTANEOUS WEEKLY
COMMUNITY
End: 2023-12-15

## 2023-09-27 RX ORDER — ATORVASTATIN CALCIUM 20 MG/1
1 TABLET, FILM COATED ORAL DAILY
COMMUNITY
Start: 2022-11-28 | End: 2024-01-30 | Stop reason: ENTERED-IN-ERROR

## 2023-10-03 ENCOUNTER — OFFICE VISIT (OUTPATIENT)
Dept: PULMONOLOGY | Facility: CLINIC | Age: 63
End: 2023-10-03
Payer: COMMERCIAL

## 2023-10-03 VITALS
SYSTOLIC BLOOD PRESSURE: 108 MMHG | BODY MASS INDEX: 44.07 KG/M2 | HEART RATE: 70 BPM | OXYGEN SATURATION: 95 % | TEMPERATURE: 97.9 F | DIASTOLIC BLOOD PRESSURE: 74 MMHG | RESPIRATION RATE: 16 BRPM | WEIGHT: 315 LBS

## 2023-10-03 DIAGNOSIS — G47.33 OSA ON CPAP: Primary | ICD-10-CM

## 2023-10-03 PROCEDURE — 99213 OFFICE O/P EST LOW 20 MIN: CPT | Performed by: INTERNAL MEDICINE

## 2023-10-03 PROCEDURE — 3078F DIAST BP <80 MM HG: CPT | Performed by: INTERNAL MEDICINE

## 2023-10-03 PROCEDURE — 3074F SYST BP LT 130 MM HG: CPT | Performed by: INTERNAL MEDICINE

## 2023-10-03 PROCEDURE — 1036F TOBACCO NON-USER: CPT | Performed by: INTERNAL MEDICINE

## 2023-10-03 PROCEDURE — 4010F ACE/ARB THERAPY RXD/TAKEN: CPT | Performed by: INTERNAL MEDICINE

## 2023-10-03 NOTE — PROGRESS NOTES
Department of Medicine  Division of Pulmonary, Critical Care, and Sleep Medicine  Follow-Up Visit  [facility] - [address], [address]    Physician HPI (10/3/2023):  Mr. Sierra is a 61-year-old male with past medical history significant for morbid obesity status post gastric bypass surgery 7 years ago, severe obstructive sleep apnea on CPAP currently set at 13 cmH2O, hypertension, diabetes mellitus, A. fib status post ablation and hyperlipidemia who presented to the office today to establish care for obstructive sleep apnea.     Allen reports a stable respiratory status since last visit.  He is following up with cardiology regarding paroxysmal atrial fibrillation with RVR.  He reports having COVID-19 infection in December which triggered A-fib/RVR.  He is scheduled for a cardiac PET scan at the end of the month.  He has had an excellent compliance with CPAP.  90-day CPAP data is reviewed today.  Average AHI is 1.6/h.  He denies excessive daytime sleepiness or fatigue.        Immunization History   Administered Date(s) Administered    Flu vaccine (IIV4), preservative free *Check age/dose* 10/13/2018, 10/19/2019, 09/24/2020, 09/27/2021, 10/11/2022    Influenza, seasonal, injectable 11/14/2007, 10/08/2014    Influenza, seasonal, injectable, preservative free 10/12/2015    Pfizer COVID-19 vaccine, bivalent, age 12 years and older (30 mcg/0.3 mL) 10/11/2022    Pfizer Gray Cap SARS-CoV-2 05/15/2022    Pfizer Purple Cap SARS-CoV-2 02/04/2021, 02/26/2021, 10/18/2021    Pneumococcal polysaccharide vaccine, 23-valent, age 2 years and older (PNEUMOVAX 23) 12/17/2010    Tdap vaccine, age 7 year and older (BOOSTRIX) 08/29/2014, 01/01/2015, 07/31/2023    Zoster vaccine, recombinant, adult (SHINGRIX) 08/31/2018, 01/25/2019    Zoster, live 12/13/2015       Current Outpatient Medications   Medication Instructions    amiodarone (Pacerone) 200 mg tablet 1 tablet, oral, Daily    ascorbic acid (Vitamin C) 500 mg chewable tablet oral     "ascorbic acid (VITAMIN C) 1,000 mg, oral, Daily RT    atorvastatin (Lipitor) 20 mg tablet 1 tablet, oral, Daily    BD Johanne 2nd Gen Pen Needle 32 gauge x 5/32\" needle USE AS DIRECTED DAILY    calcium carbonate-vitamin D3 600 mg-5 mcg (200 unit) tablet 1 tablet, oral    CALCIUM CARBONATE-VITAMIN D3 ORAL oral    carvedilol (Coreg) 12.5 mg tablet 1 tablet, oral, 2 times daily with meals    carvedilol (Coreg) 25 mg tablet 1 tablet, oral, 2 times daily with meals    coenzyme Q-10 200 mg, oral, Daily    empagliflozin (Jardiance) 25 mg 1 tablet, oral, Daily    ergocalciferol (Vitamin D-2) 50 MCG (2000 UT) capsule capsule oral, Daily    febuxostat (Uloric) 80 mg tablet     FLUoxetine (PROZAC) 40 mg, oral, 2 times daily    Januvia 100 mg, oral, Daily    lancets misc 1 daily     Lantus Solostar U-100 Insulin 34 Units, subcutaneous, Nightly    linaGLIPtin (Tradjenta) 5 mg tablet 1 tablet, oral, Daily    lisinopril 20 mg, oral, Daily    metFORMIN XR (Glucophage-XR) 500 mg 24 hr tablet Take 2 tablets (1,000 mg) by mouth 2 times a day.    miconazole, bulk, powder Topical, 2 times daily    multivitamin-children's (Cerovite, Jr) chewable tablet 2 tablets, oral, Daily RT    Ozempic 0.25 mg or 0.5 mg (2 mg/3 mL) pen injector 0.5 once weekly    Ozempic 0.25 mg, subcutaneous, Weekly    pantoprazole (PROTONIX) 40 mg, oral, Daily, Do not crush, chew, or split.    pedi multivit no.25-folic acid (Flintstones Multivitamin) 300 mcg tablet,chewable oral    potassium chloride CR 10 mEq ER tablet 20 mEq, oral, 2 times daily, Do not crush, chew, or split.    quinapril (Accupril) 20 mg tablet     quinapril (Accupril) 40 mg tablet 0.5 tablets, oral, Daily    Semglee,insulin glarg-yfgn,Pen 100 unit/mL (3 mL) Pen 34 units once daily (replacing basaglar due to insurance formulary)    simvastatin (Zocor) 20 mg tablet     simvastatin (ZOCOR) 40 mg, oral, Nightly    sotalol (BETAPACE) 40 mg, oral, 2 times daily    spironolactone (Aldactone) 25 mg tablet " "1 half tablet, oral, Daily    spironolactone (ALDACTONE) 25 mg, oral, Daily    torsemide (DEMADEX) 60 mg, oral, Daily    warfarin (Coumadin) 4 mg tablet Warfarin Sodium TABS  Refills: 0    warfarin (COUMADIN) 5 mg, oral, On Monday and Friday and 0.5 tablet on Tuesday, Wednesday, Thursday, Saturday, and Sunday         Allergies:  No Known Allergies       Visit Vitals  /74 (BP Location: Left arm, Patient Position: Sitting, BP Cuff Size: Large adult)   Pulse 70   Temp 36.6 °C (97.9 °F) (Temporal)   Resp 16   Wt 143 kg (316 lb)   SpO2 95%   BMI 44.07 kg/m²   Smoking Status Never   BSA 2.68 m²        Physical Exam  Vitals and nursing note reviewed.   Constitutional:       General: He is not in acute distress.     Appearance: Normal appearance.   HENT:      Head: Normocephalic and atraumatic.   Eyes:      Conjunctiva/sclera: Conjunctivae normal.   Cardiovascular:      Rate and Rhythm: Normal rate and regular rhythm.   Pulmonary:      Effort: Pulmonary effort is normal. No respiratory distress.      Breath sounds: Normal breath sounds.   Musculoskeletal:      Cervical back: Neck supple.   Skin:     Coloration: Skin is not jaundiced.   Neurological:      General: No focal deficit present.      Mental Status: He is alert and oriented to person, place, and time. Mental status is at baseline.   Psychiatric:         Mood and Affect: Mood normal.         Behavior: Behavior normal.         Judgment: Judgment normal.            Pulmonary Function Test Results     Pulmonary Functions Testing Results:    No results found for: \"FEV1\", \"FVC\", \"NIS4CDB\", \"TLC\", \"DLCO\"      Chest Radiograph     No results found for this or any previous visit from the past 2000 days.      Echocardiogram     No results found for this or any previous visit from the past 365 days.       Chest CT Scan     No results found for this or any previous visit from the past 365 days.       Laboratory Studies     Lab Results   Component Value Date    WBC 5.7 " "07/31/2023    HGB 12.1 (L) 07/31/2023    HCT 39.2 (L) 07/31/2023    MCV 88 07/31/2023     07/31/2023      Lab Results   Component Value Date    GLUCOSE 96 07/31/2023    CALCIUM 8.7 07/31/2023     07/31/2023    K 4.0 07/31/2023    CO2 32 07/31/2023     07/31/2023    BUN 19 07/31/2023    CREATININE 0.94 07/31/2023      Lab Results   Component Value Date    ALT 25 07/31/2023    AST 23 07/31/2023    ALKPHOS 54 07/31/2023    BILITOT 1.5 (H) 07/31/2023        Protime   Date/Time Value Ref Range Status   01/08/2020 10:22 AM 13.0 (H) 9.7 - 12.7 sec Final     INR   Date/Time Value Ref Range Status   01/08/2020 10:22 AM 1.2 (H) 0.9 - 1.1 Final       No results found for: \"ICIGE\", \"IGE\", \"ICA04\", \"ASPFU\", \"IGG\", \"IGA\", \"IGM\"          Assessment and Plan / Recommendations     1. Morbid obesity s/p Gastric bypass surgery  2. Severe RENÉ on CPAP  3. HTN, DM and Hyperlipidemia  4. Hx of Afib s/p ablation      Recommend:  Continue on CPAP on current settings via nasal pillows.  F/U in 6 months        Fátima Fuchs MD   10/03/2023  "

## 2023-10-09 DIAGNOSIS — E11.42 TYPE 2 DIABETES MELLITUS WITH DIABETIC POLYNEUROPATHY, WITH LONG-TERM CURRENT USE OF INSULIN (MULTI): ICD-10-CM

## 2023-10-09 DIAGNOSIS — Z79.4 TYPE 2 DIABETES MELLITUS WITH DIABETIC POLYNEUROPATHY, WITH LONG-TERM CURRENT USE OF INSULIN (MULTI): ICD-10-CM

## 2023-10-09 RX ORDER — SEMAGLUTIDE 0.68 MG/ML
0.5 INJECTION, SOLUTION SUBCUTANEOUS WEEKLY
Qty: 3 ML | Refills: 3 | Status: SHIPPED | OUTPATIENT
Start: 2023-10-09 | End: 2023-12-15 | Stop reason: DRUGHIGH

## 2023-10-09 RX ORDER — INSULIN GLARGINE 100 [IU]/ML
32 INJECTION, SOLUTION SUBCUTANEOUS NIGHTLY
Qty: 15 ML | Refills: 3 | Status: SHIPPED | OUTPATIENT
Start: 2023-10-09 | End: 2024-04-09 | Stop reason: SDUPTHER

## 2023-12-15 ENCOUNTER — OFFICE VISIT (OUTPATIENT)
Dept: PRIMARY CARE | Facility: CLINIC | Age: 63
End: 2023-12-15
Payer: COMMERCIAL

## 2023-12-15 VITALS
RESPIRATION RATE: 18 BRPM | HEIGHT: 71 IN | TEMPERATURE: 97 F | SYSTOLIC BLOOD PRESSURE: 105 MMHG | OXYGEN SATURATION: 97 % | BODY MASS INDEX: 42.28 KG/M2 | DIASTOLIC BLOOD PRESSURE: 68 MMHG | HEART RATE: 92 BPM | WEIGHT: 302 LBS

## 2023-12-15 DIAGNOSIS — I50.9 CONGESTIVE HEART FAILURE, UNSPECIFIED HF CHRONICITY, UNSPECIFIED HEART FAILURE TYPE (MULTI): ICD-10-CM

## 2023-12-15 DIAGNOSIS — E87.6 HYPOKALEMIA: ICD-10-CM

## 2023-12-15 DIAGNOSIS — E11.42 TYPE 2 DIABETES MELLITUS WITH DIABETIC POLYNEUROPATHY, WITH LONG-TERM CURRENT USE OF INSULIN (MULTI): ICD-10-CM

## 2023-12-15 DIAGNOSIS — Z79.4 TYPE 2 DIABETES MELLITUS WITH DIABETIC POLYNEUROPATHY, WITH LONG-TERM CURRENT USE OF INSULIN (MULTI): ICD-10-CM

## 2023-12-15 DIAGNOSIS — K46.9 ABDOMINAL HERNIA WITHOUT OBSTRUCTION AND WITHOUT GANGRENE, RECURRENCE NOT SPECIFIED, UNSPECIFIED HERNIA TYPE: Primary | ICD-10-CM

## 2023-12-15 PROCEDURE — 3074F SYST BP LT 130 MM HG: CPT | Performed by: FAMILY MEDICINE

## 2023-12-15 PROCEDURE — 1036F TOBACCO NON-USER: CPT | Performed by: FAMILY MEDICINE

## 2023-12-15 PROCEDURE — 4010F ACE/ARB THERAPY RXD/TAKEN: CPT | Performed by: FAMILY MEDICINE

## 2023-12-15 PROCEDURE — 3078F DIAST BP <80 MM HG: CPT | Performed by: FAMILY MEDICINE

## 2023-12-15 PROCEDURE — 99213 OFFICE O/P EST LOW 20 MIN: CPT | Performed by: FAMILY MEDICINE

## 2023-12-15 RX ORDER — EMPAGLIFLOZIN 10 MG/1
10 TABLET, FILM COATED ORAL
COMMUNITY
Start: 2023-12-02 | End: 2024-01-30 | Stop reason: ENTERED-IN-ERROR

## 2023-12-15 RX ORDER — TORSEMIDE 20 MG/1
60 TABLET ORAL 2 TIMES DAILY
Qty: 270 TABLET | Refills: 3 | COMMUNITY
Start: 2023-12-15

## 2023-12-15 RX ORDER — SEMAGLUTIDE 0.68 MG/ML
0.5 INJECTION, SOLUTION SUBCUTANEOUS WEEKLY
Qty: 3 ML | Refills: 3 | Status: SHIPPED | COMMUNITY
Start: 2023-12-15 | End: 2023-12-26 | Stop reason: SDUPTHER

## 2023-12-15 RX ORDER — POTASSIUM CHLORIDE 20 MEQ/1
TABLET, EXTENDED RELEASE ORAL
Qty: 270 TABLET | Refills: 3 | Status: SHIPPED | COMMUNITY
Start: 2023-12-15 | End: 2024-01-09 | Stop reason: WASHOUT

## 2023-12-15 ASSESSMENT — ENCOUNTER SYMPTOMS
APPETITE CHANGE: 0
NAUSEA: 0
DIARRHEA: 0
SHORTNESS OF BREATH: 0
ACTIVITY CHANGE: 0
CHILLS: 0
CONSTIPATION: 0
FEVER: 0
HEADACHES: 0
VOMITING: 0

## 2023-12-15 NOTE — PROGRESS NOTES
"Subjective     Allen Sierra is a 63 y.o. male who presents for Hernia.    HPI     Pt reports central lower chest/upper abdomen/epigastric hernia , non-tender, more noticeable with bearing down/increasing intraabdominal pressure.  He noticed it one week ago.  He does have hx of gastric bypass surgery in 2011.      His GI doctor is Dr. Garcia at [facility].   He had cardiac ablation (MAZE procedure)  in April 2023.     Review of Systems   Constitutional:  Negative for activity change, appetite change, chills and fever.   Respiratory:  Negative for shortness of breath.    Cardiovascular:  Negative for chest pain.   Gastrointestinal:  Negative for constipation, diarrhea, nausea and vomiting.   Neurological:  Negative for headaches.       Objective     Vitals:    12/15/23 1037   BP: 105/68   BP Location: Right arm   Patient Position: Sitting   Pulse: 92   Resp: 18   Temp: 36.1 °C (97 °F)   TempSrc: Temporal   SpO2: 97%   Weight: 137 kg (302 lb)   Height: 1.803 m (5' 11\")        Current Outpatient Medications   Medication Instructions    amiodarone (Pacerone) 200 mg tablet 1 tablet, oral, Daily    ascorbic acid (VITAMIN C) 1,000 mg, oral, Daily RT    atorvastatin (Lipitor) 20 mg tablet 1 tablet, oral, Daily    BD Johanne 2nd Gen Pen Needle 32 gauge x 5/32\" needle USE AS DIRECTED DAILY    calcium carbonate-vitamin D3 600 mg-5 mcg (200 unit) tablet 1 tablet, oral    carvedilol (Coreg) 12.5 mg tablet 1 tablet, oral, 2 times daily with meals    coenzyme Q-10 200 mg, oral, Daily    ergocalciferol (Vitamin D-2) 50 MCG (2000 UT) capsule capsule oral, Daily    febuxostat (Uloric) 80 mg tablet     FLUoxetine (PROZAC) 40 mg, oral, 2 times daily    insulin glargine (LANTUS SOLOSTAR U-100 INSULIN) 32 Units, subcutaneous, Nightly    Januvia 100 mg, oral, Daily    Jardiance 10 mg, oral, Daily with breakfast    lancets misc 1 daily     linaGLIPtin (Tradjenta) 5 mg tablet 1 tablet, oral, Daily    lisinopril 20 mg, oral, Daily    " metFORMIN XR (Glucophage-XR) 500 mg 24 hr tablet Take 2 tablets (1,000 mg) by mouth 2 times a day.    miconazole, bulk, powder Topical, 2 times daily    multivitamin-children's (Cerovite, Jr) chewable tablet 2 tablets, oral, Daily RT    Ozempic 0.5 mg, subcutaneous, Weekly    pantoprazole (PROTONIX) 40 mg, oral, Daily, Do not crush, chew, or split.    potassium chloride CR 20 mEq ER tablet 3 tabs BID    sacubitril/valsartan (ENTRESTO ORAL) 24 mg, oral, Daily    simvastatin (Zocor) 20 mg tablet     sotalol (BETAPACE) 40 mg, oral, 2 times daily    spironolactone (ALDACTONE) 25 mg, oral, Daily    torsemide (DEMADEX) 60 mg, oral, 2 times daily    warfarin (Coumadin) 4 mg tablet Warfarin Sodium TABS  Refills: 0    warfarin (COUMADIN) 5 mg, oral, On Monday and Friday and 0.5 tablet on Tuesday, Wednesday, Thursday, Saturday, and Sunday         Past Surgical History:   Procedure Laterality Date    CARDIAC SURGERY      MAZE procedure - 3/2023 -[facility] cardio    OTHER SURGICAL HISTORY  06/25/2019    Rotator cuff repair    OTHER SURGICAL HISTORY  06/25/2019    Hernia repair    OTHER SURGICAL HISTORY  09/27/2021    Colonoscopy    OTHER SURGICAL HISTORY  09/27/2021    Ablation    OTHER SURGICAL HISTORY  11/04/2021    Gastric bypass surgery        Social History     Tobacco Use    Smoking status: Never    Smokeless tobacco: Never   Vaping Use    Vaping Use: Never used        Family History   Problem Relation Name Age of Onset    Heart failure Mother      Heart failure Father          Immunization History   Administered Date(s) Administered    Flu vaccine (IIV4), preservative free *Check age/dose* 10/13/2018, 10/19/2019, 09/24/2020, 09/27/2021, 10/11/2022    Influenza, seasonal, injectable 11/14/2007, 10/08/2014    Influenza, seasonal, injectable, preservative free 10/12/2015    Pfizer COVID-19 vaccine, bivalent, age 12 years and older (30 mcg/0.3 mL) 10/11/2022    Pfizer Gray Cap SARS-CoV-2 05/15/2022    Pfizer Purple Cap SARS-CoV-2  02/04/2021, 02/26/2021, 10/18/2021    Pneumococcal polysaccharide vaccine, 23-valent, age 2 years and older (PNEUMOVAX 23) 12/17/2010    Tdap vaccine, age 7 year and older (BOOSTRIX) 08/29/2014, 01/01/2015, 07/31/2023    Zoster vaccine, recombinant, adult (SHINGRIX) 08/31/2018, 01/25/2019    Zoster, live 12/13/2015        Physical Exam  Vitals reviewed.   Constitutional:       General: He is not in acute distress.     Appearance: Normal appearance. He is well-developed. He is obese.   HENT:      Head: Normocephalic and atraumatic.   Eyes:      General: Lids are normal.      Conjunctiva/sclera:      Right eye: Right conjunctiva is not injected.      Left eye: Left conjunctiva is not injected.   Cardiovascular:      Rate and Rhythm: Normal rate and regular rhythm.      Heart sounds: No murmur heard.  Pulmonary:      Effort: Pulmonary effort is normal. No respiratory distress.      Breath sounds: Normal breath sounds. No wheezing, rhonchi or rales.   Abdominal:      General: Abdomen is flat.      Palpations: Abdomen is soft.      Tenderness: There is no abdominal tenderness.      Hernia: A hernia (epigastric - soft , nontender.) is present.   Skin:     General: Skin is warm and dry.      Findings: No rash.   Neurological:      Mental Status: He is alert and oriented to person, place, and time. Mental status is at baseline.   Psychiatric:         Mood and Affect: Mood normal.         Behavior: Behavior normal.         Problem List Items Addressed This Visit       CHF (congestive heart failure) (CMS/LTAC, located within [facility])    Relevant Medications    sacubitril/valsartan (ENTRESTO ORAL)    torsemide (Demadex) 20 mg tablet    T2DM (type 2 diabetes mellitus) (CMS/HCC)    Relevant Medications    Ozempic 0.25 mg or 0.5 mg (2 mg/3 mL) pen injector    Hypokalemia    Relevant Medications    potassium chloride CR 20 mEq ER tablet     Other Visit Diagnoses       Abdominal hernia without obstruction and without gangrene, recurrence not specified,  unspecified hernia type    -  Primary    Relevant Orders    Referral to General Surgery            Assessment/Plan     Hernia vs rectus diastasis , epigastric region - will refer to general surgery for further evaluation.      Follow up as needed    Go the the nearest Emergency Department if your condition worsens significantly or becomes life-threatening.

## 2023-12-26 DIAGNOSIS — E11.42 TYPE 2 DIABETES MELLITUS WITH DIABETIC POLYNEUROPATHY, WITH LONG-TERM CURRENT USE OF INSULIN (MULTI): ICD-10-CM

## 2023-12-26 DIAGNOSIS — Z79.4 TYPE 2 DIABETES MELLITUS WITH DIABETIC POLYNEUROPATHY, WITH LONG-TERM CURRENT USE OF INSULIN (MULTI): ICD-10-CM

## 2023-12-26 RX ORDER — SEMAGLUTIDE 0.68 MG/ML
0.5 INJECTION, SOLUTION SUBCUTANEOUS WEEKLY
Qty: 3 ML | Refills: 3 | Status: SHIPPED | OUTPATIENT
Start: 2023-12-26 | End: 2023-12-27 | Stop reason: SDUPTHER

## 2023-12-26 NOTE — TELEPHONE ENCOUNTER
PT states he was advised to call PCP for refill , his appt was canceled with Endo due to her going on (Maternity leave) Per PT.    requesting refill currently on Ozempic.

## 2023-12-27 DIAGNOSIS — Z79.4 TYPE 2 DIABETES MELLITUS WITH DIABETIC POLYNEUROPATHY, WITH LONG-TERM CURRENT USE OF INSULIN (MULTI): ICD-10-CM

## 2023-12-27 DIAGNOSIS — E11.42 TYPE 2 DIABETES MELLITUS WITH DIABETIC POLYNEUROPATHY, WITH LONG-TERM CURRENT USE OF INSULIN (MULTI): ICD-10-CM

## 2023-12-27 RX ORDER — SEMAGLUTIDE 0.68 MG/ML
0.75 INJECTION, SOLUTION SUBCUTANEOUS WEEKLY
Qty: 3 ML | Refills: 3 | Status: SHIPPED | OUTPATIENT
Start: 2023-12-27 | End: 2023-12-29

## 2023-12-28 ENCOUNTER — APPOINTMENT (OUTPATIENT)
Dept: ENDOCRINOLOGY | Facility: CLINIC | Age: 63
End: 2023-12-28
Payer: COMMERCIAL

## 2023-12-29 ENCOUNTER — TELEPHONE (OUTPATIENT)
Dept: PRIMARY CARE | Facility: CLINIC | Age: 63
End: 2023-12-29
Payer: COMMERCIAL

## 2023-12-29 DIAGNOSIS — Z79.4 TYPE 2 DIABETES MELLITUS WITH DIABETIC POLYNEUROPATHY, WITH LONG-TERM CURRENT USE OF INSULIN (MULTI): Primary | ICD-10-CM

## 2023-12-29 DIAGNOSIS — E11.42 TYPE 2 DIABETES MELLITUS WITH DIABETIC POLYNEUROPATHY, WITH LONG-TERM CURRENT USE OF INSULIN (MULTI): Primary | ICD-10-CM

## 2023-12-29 RX ORDER — SEMAGLUTIDE 1.34 MG/ML
1 INJECTION, SOLUTION SUBCUTANEOUS
Qty: 3 ML | Refills: 5 | Status: SHIPPED | OUTPATIENT
Start: 2023-12-29

## 2023-12-29 NOTE — TELEPHONE ENCOUNTER
Per [facility] pharmacist, they cannot fill Ozempic 0.75 mg - there is no pen that can deliver this amount, it would have to be 0.5 or 1. Please advise.

## 2024-01-09 ENCOUNTER — OFFICE VISIT (OUTPATIENT)
Dept: SURGERY | Facility: CLINIC | Age: 64
End: 2024-01-09
Payer: COMMERCIAL

## 2024-01-09 ENCOUNTER — HOSPITAL ENCOUNTER (OUTPATIENT)
Facility: HOSPITAL | Age: 64
Setting detail: SURGERY ADMIT
End: 2024-01-09
Attending: SURGERY | Admitting: SURGERY
Payer: COMMERCIAL

## 2024-01-09 VITALS
TEMPERATURE: 98.3 F | HEART RATE: 68 BPM | OXYGEN SATURATION: 96 % | BODY MASS INDEX: 43 KG/M2 | HEIGHT: 71 IN | SYSTOLIC BLOOD PRESSURE: 114 MMHG | DIASTOLIC BLOOD PRESSURE: 68 MMHG | RESPIRATION RATE: 16 BRPM | WEIGHT: 307.13 LBS

## 2024-01-09 DIAGNOSIS — K46.9 NON-RECURRENT ABDOMINAL HERNIA WITHOUT OBSTRUCTION OR GANGRENE, UNSPECIFIED HERNIA TYPE: Primary | ICD-10-CM

## 2024-01-09 DIAGNOSIS — K46.9 ABDOMINAL HERNIA WITHOUT OBSTRUCTION AND WITHOUT GANGRENE, RECURRENCE NOT SPECIFIED, UNSPECIFIED HERNIA TYPE: Primary | ICD-10-CM

## 2024-01-09 DIAGNOSIS — Z79.4 LONG TERM (CURRENT) USE OF INSULIN (MULTI): ICD-10-CM

## 2024-01-09 DIAGNOSIS — E11.3553: ICD-10-CM

## 2024-01-09 PROCEDURE — 4010F ACE/ARB THERAPY RXD/TAKEN: CPT | Performed by: SURGERY

## 2024-01-09 PROCEDURE — 1036F TOBACCO NON-USER: CPT | Performed by: SURGERY

## 2024-01-09 PROCEDURE — 99204 OFFICE O/P NEW MOD 45 MIN: CPT | Performed by: SURGERY

## 2024-01-09 PROCEDURE — 3074F SYST BP LT 130 MM HG: CPT | Performed by: SURGERY

## 2024-01-09 PROCEDURE — 3078F DIAST BP <80 MM HG: CPT | Performed by: SURGERY

## 2024-01-09 RX ORDER — METFORMIN HYDROCHLORIDE 500 MG/1
1000 TABLET, EXTENDED RELEASE ORAL
Qty: 360 TABLET | Refills: 1 | Status: SHIPPED | OUTPATIENT
Start: 2024-01-09 | End: 2024-01-29 | Stop reason: SDUPTHER

## 2024-01-09 RX ORDER — SODIUM CHLORIDE 0.9 % (FLUSH) 0.9 %
10 SYRINGE (ML) INJECTION
COMMUNITY
Start: 2023-08-29 | End: 2024-01-30 | Stop reason: ENTERED-IN-ERROR

## 2024-01-09 RX ORDER — POTASSIUM CHLORIDE 750 MG/1
20 TABLET, EXTENDED RELEASE ORAL
COMMUNITY
Start: 2024-01-05

## 2024-01-09 RX ORDER — SACUBITRIL AND VALSARTAN 24; 26 MG/1; MG/1
1 TABLET, FILM COATED ORAL 2 TIMES DAILY
COMMUNITY
Start: 2023-10-03 | End: 2024-01-30 | Stop reason: ENTERED-IN-ERROR

## 2024-01-09 RX ORDER — FERROUS SULFATE 325(65) MG
325 TABLET ORAL 2 TIMES DAILY
COMMUNITY
Start: 2023-09-08

## 2024-01-09 RX ORDER — FOLIC ACID 1 MG/1
1 TABLET ORAL DAILY
COMMUNITY

## 2024-01-09 NOTE — PROGRESS NOTES
Allen Sierra   [id number]   1960         Chief Complaint/      Lump of midline abdomen            HPI/    63-year-old male seen for a lump in the epigastric midline.    Patient has a lump in the epigastric midline.  She notes that over the last month or 2.  It is worse when he initiates a bowel movement or strains    Patient is status post the procedure for cardiac issues with access via the upper abdomen via a midline epigastric incision in the past.    The new lump is in the vicinity of the epigastric incision      Past Medical History:   Diagnosis Date    Cellulitis of right lower limb 03/11/2021    Cellulitis of foot, right    Mass of soft tissue of neck 04/18/2023    S/p removal by general surgery - Dr. Mukherjee - benign cyst .      Unspecified injury of unspecified foot, initial encounter 03/03/2019    Foot injury   Atrial fibrillation, Coumadin, status post ablation, no pacemaker, no stents    Obesity, status post laparoscopic gastric bypass    Hypertension    IDDM    Elevated lipids    Social History     Socioeconomic History    Marital status: Single     Spouse name: Not on file    Number of children: Not on file    Years of education: Not on file    Highest education level: Not on file   Occupational History    Not on file   Tobacco Use    Smoking status: Never    Smokeless tobacco: Never   Vaping Use    Vaping Use: Never used   Substance and Sexual Activity    Alcohol use: Not on file    Drug use: Not on file    Sexual activity: Not on file   Other Topics Concern    Not on file   Social History Narrative    Not on file     Social Determinants of Health     Financial Resource Strain: Not on file   Food Insecurity: Not on file   Transportation Needs: Not on file   Physical Activity: Not on file   Stress: Not on file   Social Connections: Not on file   Intimate Partner Violence: Not on file   Housing Stability: Not on file      Non-smoker    Family History   Problem Relation Name Age of Onset    Heart failure  "Mother      Heart failure Father          Review of Systems   All other systems reviewed and are negative.         Current Outpatient Medications:     amiodarone (Pacerone) 200 mg tablet, Take 1 tablet (200 mg) by mouth once daily., Disp: , Rfl:     ascorbic acid (Vitamin C) 500 mg tablet, Take 2 tablets (1,000 mg) by mouth once daily., Disp: , Rfl:     atorvastatin (Lipitor) 20 mg tablet, Take 1 tablet (20 mg) by mouth once daily., Disp: , Rfl:     BD Johanne 2nd Gen Pen Needle 32 gauge x 5/32\" needle, USE AS DIRECTED DAILY, Disp: , Rfl:     calcium carbonate-vitamin D3 600 mg-5 mcg (200 unit) tablet, Take 1 tablet by mouth., Disp: , Rfl:     coenzyme Q-10 200 mg capsule, Take 1 capsule (200 mg) by mouth once daily., Disp: , Rfl:     Entresto 24-26 mg tablet, Take 1 tablet by mouth twice a day., Disp: , Rfl:     ergocalciferol (Vitamin D-2) 50 MCG (2000 UT) capsule capsule, Take by mouth once daily., Disp: , Rfl:     ferrous sulfate, 325 mg ferrous sulfate, tablet, Take 1 tablet by mouth twice a day., Disp: , Rfl:     FLUoxetine (PROzac) 40 mg capsule, Take 1 capsule (40 mg) by mouth 2 times a day., Disp: , Rfl:     insulin glargine (Lantus Solostar U-100 Insulin) 100 unit/mL (3 mL) pen, Inject 32 Units under the skin once daily at bedtime., Disp: 15 mL, Rfl: 3    Januvia 100 mg tablet, Take 1 tablet (100 mg) by mouth once daily., Disp: , Rfl:     Jardiance 10 mg, Take 1 tablet (10 mg) by mouth once daily with breakfast., Disp: , Rfl:     lancets misc, 1 daily, Disp: , Rfl:     linaGLIPtin (Tradjenta) 5 mg tablet, Take 1 tablet (5 mg) by mouth once daily., Disp: , Rfl:     lisinopril 20 mg tablet, Take 1 tablet (20 mg) by mouth once daily., Disp: 90 tablet, Rfl: 3    metFORMIN  mg 24 hr tablet, Take 2 tablets (1,000 mg) by mouth 2 times a day with meals., Disp: 360 tablet, Rfl: 1    multivitamin-children's (Cerovite, Jr) chewable tablet, Chew 2 tablets once daily., Disp: , Rfl:     pantoprazole (ProtoNix) 40 mg " "EC tablet, Take 1 tablet (40 mg) by mouth once daily. Do not crush, chew, or split., Disp: 90 tablet, Rfl: 3    potassium chloride CR 10 mEq ER tablet, , Disp: , Rfl:     semaglutide (Ozempic) 1 mg/dose (4 mg/3 mL) pen injector, Inject 1 mg under the skin 1 (one) time per week., Disp: 3 mL, Rfl: 5    simvastatin (Zocor) 20 mg tablet, , Disp: , Rfl:     sodium chloride 0.9% (sodium chloride) flush, Infuse 10 mL into a venous catheter., Disp: , Rfl:     sotalol (Betapace) 80 mg tablet, Take 0.5 tablets (40 mg) by mouth twice a day., Disp: , Rfl:     spironolactone (Aldactone) 25 mg tablet, Take 1 tablet (25 mg) by mouth once daily., Disp: 90 tablet, Rfl: 3    torsemide (Demadex) 20 mg tablet, Take 3 tablets (60 mg) by mouth 2 times a day., Disp: 270 tablet, Rfl: 3    warfarin (Coumadin) 5 mg tablet, Take 1 tablet (5 mg) by mouth. On Monday and Friday and 0.5 tablet on Tuesday, Wednesday, Thursday, Saturday, and Sunday, Disp: , Rfl:     carvedilol (Coreg) 12.5 mg tablet, Take 1 tablet (12.5 mg) by mouth 2 times a day with meals., Disp: , Rfl:     febuxostat (Uloric) 80 mg tablet, , Disp: , Rfl:     folic acid (Folvite) 1 mg tablet, Take 1 tablet (1 mg) by mouth once daily., Disp: , Rfl:     miconazole, bulk, powder, Apply topically 2 times a day., Disp: , Rfl:     warfarin (Coumadin) 4 mg tablet, Warfarin Sodium TABS Refills: 0, Disp: , Rfl:      No Known Allergies     Electrocardiogram 12 Lead  Atrial fibrillation  Cannot rule out Inferior infarct , age undetermined  Anterolateral infarct , age undetermined  Abnormal ECG  No previous ECGs available  Confirmed by ARLETTE SEALS MD ([id number]) on 6/8/2020 6:22:44 PM       [unfilled]     /68 (BP Location: Right arm, Patient Position: Sitting, BP Cuff Size: Adult)   Pulse 68   Temp 36.8 °C (98.3 °F) (Temporal)   Resp 16   Ht 1.803 m (5' 11\")   Wt 139 kg (307 lb 2 oz)   SpO2 96%   BMI 42.84 kg/m²        Physical Exam  Constitutional:       Appearance: Normal " appearance.   HENT:      Head: Normocephalic.   Cardiovascular:      Rate and Rhythm: Normal rate.   Pulmonary:      Effort: Pulmonary effort is normal.      Breath sounds: Normal breath sounds.   Abdominal:      Comments: Obese    Soft and nontender    No local hernia    Vertical epigastric scar noted, hernial mass at superior apex of this.,  Reduces.  Defect at least 2 or 3 cm   Musculoskeletal:      Cervical back: Normal range of motion.   Neurological:      Mental Status: He is alert.                  Assessment/    Incisional hernia    Atrial fibrillation  Anticoagulated state  Hypertension  Obesity  Diabetes      Plan/    This would require an open repair with mesh.  This be done under general anesthesia.  Anticipate an overnight stay.  Plan use of Ventralex or similar product    Before proceeding to surgery, would need cardiac clearance.  Hold the Coumadin for 3 to 5 days prior to surgery.  No history of stents or overt CHF.    Obesity puts him at higher risk for poor healing, infection and recurrence.    Note made of diabetes.  This would put him at higher chance for poor healing, infection or recurrence    Note made of hypertension and other health issues.  Patient will need to be kept in the hospital overnight for observation in order to monitor his sugar, blood pressure and cardiac rhythm..    Risk benefits indications for surgery reviewed with the patient.  The potential bleeding infection MI PE death etc. reviewed.  The anticipated convalescence is reviewed.  Use of mesh or prosthetic materials was reviewed.  All questions were answered and consent is obtained

## 2024-01-10 DIAGNOSIS — K45.8 OTHER SPECIFIED ABDOMINAL HERNIA WITHOUT OBSTRUCTION OR GANGRENE: ICD-10-CM

## 2024-01-16 ENCOUNTER — PREP FOR PROCEDURE (OUTPATIENT)
Dept: SURGERY | Facility: CLINIC | Age: 64
End: 2024-01-16
Payer: COMMERCIAL

## 2024-01-29 ENCOUNTER — TELEPHONE (OUTPATIENT)
Dept: ENDOCRINOLOGY | Facility: CLINIC | Age: 64
End: 2024-01-29
Payer: COMMERCIAL

## 2024-01-29 DIAGNOSIS — Z79.4 LONG TERM (CURRENT) USE OF INSULIN (MULTI): ICD-10-CM

## 2024-01-29 DIAGNOSIS — E11.3553: ICD-10-CM

## 2024-01-29 RX ORDER — METFORMIN HYDROCHLORIDE 500 MG/1
1000 TABLET, EXTENDED RELEASE ORAL
Qty: 360 TABLET | Refills: 1 | Status: SHIPPED | OUTPATIENT
Start: 2024-01-29 | End: 2024-04-09 | Stop reason: SDUPTHER

## 2024-01-30 ENCOUNTER — LAB (OUTPATIENT)
Dept: LAB | Facility: LAB | Age: 64
End: 2024-01-30
Payer: COMMERCIAL

## 2024-01-30 ENCOUNTER — PRE-ADMISSION TESTING (OUTPATIENT)
Dept: PREADMISSION TESTING | Facility: HOSPITAL | Age: 64
End: 2024-01-30
Payer: COMMERCIAL

## 2024-01-30 VITALS
RESPIRATION RATE: 16 BRPM | TEMPERATURE: 97.3 F | WEIGHT: 306.22 LBS | HEART RATE: 84 BPM | OXYGEN SATURATION: 98 % | DIASTOLIC BLOOD PRESSURE: 72 MMHG | HEIGHT: 71 IN | SYSTOLIC BLOOD PRESSURE: 118 MMHG | BODY MASS INDEX: 42.87 KG/M2

## 2024-01-30 DIAGNOSIS — K45.8 OTHER SPECIFIED ABDOMINAL HERNIA WITHOUT OBSTRUCTION OR GANGRENE: ICD-10-CM

## 2024-01-30 DIAGNOSIS — Z01.818 PRE-OP TESTING: ICD-10-CM

## 2024-01-30 DIAGNOSIS — R17 ELEVATED BILIRUBIN: ICD-10-CM

## 2024-01-30 DIAGNOSIS — Z79.01 CURRENT USE OF LONG TERM ANTICOAGULATION: Primary | ICD-10-CM

## 2024-01-30 DIAGNOSIS — D64.9 ANEMIA, UNSPECIFIED TYPE: ICD-10-CM

## 2024-01-30 LAB
ANION GAP SERPL CALC-SCNC: 13 MMOL/L (ref 10–20)
BASOPHILS # BLD AUTO: 0.07 X10*3/UL (ref 0–0.1)
BASOPHILS NFR BLD AUTO: 1.1 %
BILIRUB DIRECT SERPL-MCNC: 0.2 MG/DL (ref 0–0.3)
BILIRUB SERPL-MCNC: 0.8 MG/DL (ref 0–1.2)
BUN SERPL-MCNC: 19 MG/DL (ref 6–23)
CALCIUM SERPL-MCNC: 9.1 MG/DL (ref 8.6–10.3)
CHLORIDE SERPL-SCNC: 100 MMOL/L (ref 98–107)
CO2 SERPL-SCNC: 33 MMOL/L (ref 21–32)
CREAT SERPL-MCNC: 0.96 MG/DL (ref 0.5–1.3)
EGFRCR SERPLBLD CKD-EPI 2021: 90 ML/MIN/1.73M*2
EOSINOPHIL # BLD AUTO: 0.36 X10*3/UL (ref 0–0.7)
EOSINOPHIL NFR BLD AUTO: 5.4 %
ERYTHROCYTE [DISTWIDTH] IN BLOOD BY AUTOMATED COUNT: 15.2 % (ref 11.5–14.5)
FERRITIN SERPL-MCNC: 26 NG/ML (ref 20–300)
GLUCOSE SERPL-MCNC: 122 MG/DL (ref 74–99)
HCT VFR BLD AUTO: 44.7 % (ref 41–52)
HGB BLD-MCNC: 14 G/DL (ref 13.5–17.5)
IMM GRANULOCYTES # BLD AUTO: 0.01 X10*3/UL (ref 0–0.7)
IMM GRANULOCYTES NFR BLD AUTO: 0.2 % (ref 0–0.9)
INR PPP: 2.4 (ref 0.9–1.1)
IRON SATN MFR SERPL: 16 % (ref 25–45)
IRON SERPL-MCNC: 58 UG/DL (ref 35–150)
LYMPHOCYTES # BLD AUTO: 1.52 X10*3/UL (ref 1.2–4.8)
LYMPHOCYTES NFR BLD AUTO: 22.9 %
MCH RBC QN AUTO: 29 PG (ref 26–34)
MCHC RBC AUTO-ENTMCNC: 31.3 G/DL (ref 32–36)
MCV RBC AUTO: 93 FL (ref 80–100)
MONOCYTES # BLD AUTO: 0.4 X10*3/UL (ref 0.1–1)
MONOCYTES NFR BLD AUTO: 6 %
NEUTROPHILS # BLD AUTO: 4.29 X10*3/UL (ref 1.2–7.7)
NEUTROPHILS NFR BLD AUTO: 64.4 %
NRBC BLD-RTO: 0 /100 WBCS (ref 0–0)
PLATELET # BLD AUTO: 245 X10*3/UL (ref 150–450)
POTASSIUM SERPL-SCNC: 3.8 MMOL/L (ref 3.5–5.3)
PROTHROMBIN TIME: 27 SECONDS (ref 9.8–12.8)
RBC # BLD AUTO: 4.83 X10*6/UL (ref 4.5–5.9)
SODIUM SERPL-SCNC: 142 MMOL/L (ref 136–145)
TIBC SERPL-MCNC: 369 UG/DL (ref 240–445)
UIBC SERPL-MCNC: 311 UG/DL (ref 110–370)
WBC # BLD AUTO: 6.7 X10*3/UL (ref 4.4–11.3)

## 2024-01-30 PROCEDURE — 93005 ELECTROCARDIOGRAM TRACING: CPT

## 2024-01-30 PROCEDURE — 85610 PROTHROMBIN TIME: CPT

## 2024-01-30 PROCEDURE — 87081 CULTURE SCREEN ONLY: CPT | Mod: STJLAB

## 2024-01-30 PROCEDURE — 93010 ELECTROCARDIOGRAM REPORT: CPT | Performed by: INTERNAL MEDICINE

## 2024-01-30 PROCEDURE — 80048 BASIC METABOLIC PNL TOTAL CA: CPT

## 2024-01-30 PROCEDURE — 83540 ASSAY OF IRON: CPT

## 2024-01-30 PROCEDURE — 82607 VITAMIN B-12: CPT

## 2024-01-30 PROCEDURE — 82248 BILIRUBIN DIRECT: CPT

## 2024-01-30 PROCEDURE — 83550 IRON BINDING TEST: CPT

## 2024-01-30 PROCEDURE — 36415 COLL VENOUS BLD VENIPUNCTURE: CPT

## 2024-01-30 PROCEDURE — 82728 ASSAY OF FERRITIN: CPT

## 2024-01-30 PROCEDURE — 82247 BILIRUBIN TOTAL: CPT

## 2024-01-30 PROCEDURE — 85025 COMPLETE CBC W/AUTO DIFF WBC: CPT

## 2024-01-30 RX ORDER — CHLORHEXIDINE GLUCONATE ORAL RINSE 1.2 MG/ML
SOLUTION DENTAL
Qty: 473 ML | Refills: 0 | Status: SHIPPED | OUTPATIENT
Start: 2024-01-30

## 2024-01-30 RX ORDER — CHOLECALCIFEROL (VITAMIN D3) 50 MCG
50 TABLET ORAL DAILY
COMMUNITY

## 2024-01-30 ASSESSMENT — DUKE ACTIVITY SCORE INDEX (DASI)
CAN YOU HAVE SEXUAL RELATIONS: YES
CAN YOU RUN A SHORT DISTANCE: NO
CAN YOU DO MODERATE WORK AROUND THE HOUSE LIKE VACUUMING, SWEEPING FLOORS OR CARRYING GROCERIES: YES
CAN YOU CLIMB A FLIGHT OF STAIRS OR WALK UP A HILL: YES
CAN YOU WALK INDOORS, SUCH AS AROUND YOUR HOUSE: YES
CAN YOU TAKE CARE OF YOURSELF (EAT, DRESS, BATHE, OR USE TOILET): YES
DASI METS SCORE: 8.9
TOTAL_SCORE: 50.2
CAN YOU WALK A BLOCK OR TWO ON LEVEL GROUND: YES
CAN YOU DO YARD WORK LIKE RAKING LEAVES, WEEDING OR PUSHING A MOWER: YES
CAN YOU PARTICIPATE IN STRENOUS SPORTS LIKE SWIMMING, SINGLES TENNIS, FOOTBALL, BASKETBALL, OR SKIING: YES
CAN YOU DO LIGHT WORK AROUND THE HOUSE LIKE DUSTING OR WASHING DISHES: YES
CAN YOU DO HEAVY WORK AROUND THE HOUSE LIKE SCRUBBING FLOORS OR LIFTING AND MOVING HEAVY FURNITURE: YES
CAN YOU PARTICIPATE IN MODERATE RECREATIONAL ACTIVITIES LIKE GOLF, BOWLING, DANCING, DOUBLES TENNIS OR THROWING A BASEBALL OR FOOTBALL: YES

## 2024-01-30 ASSESSMENT — PAIN SCALES - GENERAL: PAINLEVEL_OUTOF10: 0 - NO PAIN

## 2024-01-30 ASSESSMENT — CHADS2 SCORE
HYPERTENSION: YES
DIABETES: YES
PRIOR STROKE OR TIA OR THROMBOEMBOLISM: NO
CHADS2 SCORE: 3
AGE GREATER THAN OR EQUAL TO 75: NO
CHF: YES

## 2024-01-30 ASSESSMENT — PAIN - FUNCTIONAL ASSESSMENT
PAIN_FUNCTIONAL_ASSESSMENT: 0-10
PAIN_FUNCTIONAL_ASSESSMENT: 0-10

## 2024-01-30 ASSESSMENT — LIFESTYLE VARIABLES: SMOKING_STATUS: NONSMOKER

## 2024-01-30 ASSESSMENT — ACTIVITIES OF DAILY LIVING (ADL): ADL_SCORE: 0

## 2024-01-30 NOTE — PREPROCEDURE INSTRUCTIONS
Medication List            Accurate as of January 30, 2024  2:20 PM. Always use your most recent med list.                ascorbic acid 500 mg tablet  Commonly known as: Vitamin C  Medication Adjustments for Surgery: Stop 7 days before surgery     calcium carbonate-vitamin D3 600 mg-5 mcg (200 unit) tablet  Medication Adjustments for Surgery: Stop 7 days before surgery     chlorhexidine 0.12 % solution  Commonly known as: Peridex  Swish and spit 15 ml for 2 doses, 15mL the night before surgery and 15 ml morning of surgery - swish for 30 seconds -DO NOT SWALLOW, SPIT OUT  Medication Adjustments for Surgery: Other (Comment)  Notes to patient: As indicated     cholecalciferol 50 MCG (2000 UT) tablet  Commonly known as: Vitamin D-3  Medication Adjustments for Surgery: Stop 7 days before surgery     coenzyme Q-10 200 mg capsule  Medication Adjustments for Surgery: Stop 7 days before surgery     empagliflozin 10 mg  Commonly known as: Jardiance  Medication Adjustments for Surgery: Stop 3 days before surgery     ferrous sulfate (325 mg ferrous sulfate) tablet  Medication Adjustments for Surgery: Continue until night before surgery     FLUoxetine 40 mg capsule  Commonly known as: PROzac  Medication Adjustments for Surgery: Take morning of surgery with sip of water, no other fluids     folic acid 1 mg tablet  Commonly known as: Folvite  Medication Adjustments for Surgery: Stop 7 days before surgery     insulin glargine 100 unit/mL (3 mL) pen  Commonly known as: Lantus Solostar U-100 Insulin  Inject 32 Units under the skin once daily at bedtime.  Medication Adjustments for Surgery: Other (Comment)  Notes to patient: TAKE HALF DOSE THE NIGHT BEFORE SURGERY AND HALF DOSE THE MORNING OF SURGERY     lisinopril 20 mg tablet  Take 1 tablet (20 mg) by mouth once daily.  Medication Adjustments for Surgery: Other (Comment)  Notes to patient: Not taking  If so...  HOLD any evening dose the night before the day of surgery  HOLD the  day of surgery     metFORMIN  mg 24 hr tablet  Commonly known as: Glucophage-XR  Take 2 tablets (1,000 mg) by mouth 2 times a day with meals.  Medication Adjustments for Surgery: Continue until night before surgery     multivitamin-children's chewable tablet  Commonly known as: Cerovite, Jr  Medication Adjustments for Surgery: Stop 7 days before surgery     Ozempic 1 mg/dose (4 mg/3 mL) pen injector  Generic drug: semaglutide  Inject 1 mg under the skin 1 (one) time per week.  Medication Adjustments for Surgery: Other (Comment)  Notes to patient: DO NOT TAKE PRIOR SUNDAY DOSE THE WEEK OF SURGERY     pantoprazole 40 mg EC tablet  Commonly known as: ProtoNix  Take 1 tablet (40 mg) by mouth once daily. Do not crush, chew, or split.  Medication Adjustments for Surgery: Take morning of surgery with sip of water, no other fluids     potassium chloride CR 10 mEq ER tablet  Commonly known as: Klor-Con  Medication Adjustments for Surgery: Continue until night before surgery     sacubitriL-valsartan 24-26 mg tablet  Commonly known as: Entresto  Medication Adjustments for Surgery: Other (Comment)  Notes to patient: Coordinate with prescribing provider for further instructions on this medications prior to surgery.     simvastatin 20 mg tablet  Commonly known as: Zocor  Medication Adjustments for Surgery: Other (Comment)  Notes to patient: May take the morning of surgery if this medication is prescribed to take in the mornings     sotalol 80 mg tablet  Commonly known as: Betapace  Medication Adjustments for Surgery: Take morning of surgery with sip of water, no other fluids     spironolactone 25 mg tablet  Commonly known as: Aldactone  Medication Adjustments for Surgery: Take morning of surgery with sip of water, no other fluids     torsemide 20 mg tablet  Commonly known as: Demadex  Medication Adjustments for Surgery: Take morning of surgery with sip of water, no other fluids  Notes to patient: DUE TO HX OF HEART FAILURE  PLEASE CONTINUE     warfarin 5 mg tablet  Commonly known as: Coumadin  Medication Adjustments for Surgery: Other (Comment)  Notes to patient: Coordinate with prescribing provider for further instructions on this medications prior to surgery.                The medication bridging plan has been discussed with the surgeon and the patient has been informed of the plan.          PRE-OPERATIVE INSTRUCTIONS    You will receive notification one business day prior to your surgery to confirm your arrival time and additional information. It is important that you answer your phone and/or check your messages during this time.    Please enter the building through the Outpatient entrance. Take the elevator off the lobby to the 2nd floor and check in at the Outpatient Surgery desk    INSTRUCTIONS:  Talk to your surgeon for instructions if you should stop your aspirin, blood thinner, or diabetes medicines.  DO NOT take any multivitamins or over the counter supplements for 7-10 days before surgery.  If not being admitted, you must have an adult immediately available to drive you home after surgery. We also highly recommend you have someone stay with you for the entire day and night of your surgery.  For children having surgery, a parent or legal guardian must accompany t hem to the surgery center. If this is not possible, please call [phone] to make additional arrangements.  For adults who are unable to consent or make medical decisions for themselves, a legal guardian or Power of  must accompany them to the surgery center. If this is not possible, please call [phone] to make additional arrangements.  Wear comfortable, loose fitting clothing.  All jewelry and piercings must be removed. If you are unable to remove an item or have a dermal piercing, please be sure to tell the nurse when you arrive for surgery.  Nail polish and make-up must be removed.  Avoid smoking or consuming alcohol for 24 hours before  surgery.  To help prevent infection, please take a shower/bath and wash your hair the night before and/or morning of surgery.    Additional instructions about eating and drinking before surgery:  Do not eat any solid foods after midnight. Milk, nutritional drinks/supplements, and infant formula are considered solid foods.  You may drink up to 12 oz. of clear liquids up to 2 hours before your arrival time for surgery, unless directed otherwise by your surgeon. Clear liquids include water, non-carbonated sports drinks (Gatorade), black tea or coffee (no creamers) and breast milk.    If you received a blue folder, please review additional information provided inside the folder regarding additional preparation.     If you have any questions or concerns, please call Pre-Admission Testing at [phone].

## 2024-01-31 LAB — VIT B12 SERPL-MCNC: 1920 PG/ML (ref 211–911)

## 2024-02-01 LAB — STAPHYLOCOCCUS SPEC CULT: ABNORMAL

## 2024-02-06 ENCOUNTER — ANESTHESIA EVENT (OUTPATIENT)
Dept: OPERATING ROOM | Facility: HOSPITAL | Age: 64
End: 2024-02-06

## 2024-02-09 ENCOUNTER — TELEPHONE (OUTPATIENT)
Dept: SURGERY | Facility: CLINIC | Age: 64
End: 2024-02-09
Payer: COMMERCIAL

## 2024-02-09 NOTE — TELEPHONE ENCOUNTER
Pt aware that he is able to stop warfarin for 5 days prior to surgery without lovenox bridge per Dr. Meghan Dubose MD

## 2024-02-12 RX ORDER — HEPARIN SODIUM 5000 [USP'U]/ML
5000 INJECTION, SOLUTION INTRAVENOUS; SUBCUTANEOUS
Status: CANCELLED | OUTPATIENT
Start: 2024-02-12

## 2024-02-12 RX ORDER — ACETAMINOPHEN AND CODEINE PHOSPHATE 300; 30 MG/1; MG/1
1 TABLET ORAL EVERY 6 HOURS PRN
Qty: 15 TABLET | Refills: 0 | Status: SHIPPED | OUTPATIENT
Start: 2024-02-12 | End: 2024-02-14 | Stop reason: HOSPADM

## 2024-02-12 NOTE — H&P
Allen Sierra   [id number]   1960         Chief Complaint/      Incisional hernia            HPI/    63-year-old male with an incisional hernia in the epigastric midline.  Presents now for elective repair    Past Medical History:   Diagnosis Date    Anxiety     Cellulitis of right lower limb 03/11/2021    Cellulitis of foot, right    CHF (congestive heart failure) (CMS/[facility])     Coronary artery disease     Depression     Diabetes mellitus (CMS/[facility])     Hypertension     Mass of soft tissue of neck 04/18/2023    S/p removal by general surgery - Dr. Mukherjee - benign cyst .      Sleep apnea     Unspecified injury of unspecified foot, initial encounter 03/03/2019    Foot injury      Atrial fibrillation, Coumadin, status post ablation, no pacemaker, no stents    Obesity  Hypertension  IDDM  Elevated lipids    Status post laparoscopic gastric bypass  Social History     Socioeconomic History    Marital status: Single     Spouse name: Not on file    Number of children: Not on file    Years of education: Not on file    Highest education level: Not on file   Occupational History    Not on file   Tobacco Use    Smoking status: Never    Smokeless tobacco: Never   Vaping Use    Vaping Use: Never used   Substance and Sexual Activity    Alcohol use: Not Currently    Drug use: Not Currently    Sexual activity: Not on file   Other Topics Concern    Not on file   Social History Narrative    Not on file     Social Determinants of Health     Financial Resource Strain: Not on file   Food Insecurity: Not on file   Transportation Needs: Not on file   Physical Activity: Not on file   Stress: Not on file   Social Connections: Not on file   Intimate Partner Violence: Not on file   Housing Stability: Not on file      Non-smoker    Family History   Problem Relation Name Age of Onset    Heart failure Mother      Diabetes Mother      Hypertension Mother      Heart failure Father      Heart disease Brother      Diabetes Brother      Hypertension  Brother          Review of Systems   All other systems reviewed and are negative.       No current facility-administered medications for this encounter.    Current Outpatient Medications:     ascorbic acid (Vitamin C) 500 mg tablet, Take 1 tablet (500 mg) by mouth once daily., Disp: , Rfl:     calcium carbonate-vitamin D3 600 mg-5 mcg (200 unit) tablet, Take 1 tablet by mouth once daily., Disp: , Rfl:     chlorhexidine (Peridex) 0.12 % solution, Swish and spit 15 ml for 2 doses, 15mL the night before surgery and 15 ml morning of surgery - swish for 30 seconds -DO NOT SWALLOW, SPIT OUT, Disp: 473 mL, Rfl: 0    cholecalciferol (Vitamin D-3) 50 MCG (2000 UT) tablet, Take 1 tablet (50 mcg) by mouth once daily., Disp: , Rfl:     coenzyme Q-10 200 mg capsule, Take 1 capsule (200 mg) by mouth once daily., Disp: , Rfl:     empagliflozin (Jardiance) 10 mg, Take 1 tablet (10 mg) by mouth once daily with breakfast., Disp: , Rfl:     ferrous sulfate, 325 mg ferrous sulfate, tablet, Take 1 tablet by mouth twice a day., Disp: , Rfl:     FLUoxetine (PROzac) 40 mg capsule, Take 2 capsules (80 mg) by mouth once daily., Disp: , Rfl:     folic acid (Folvite) 1 mg tablet, Take 1 tablet (1 mg) by mouth once daily., Disp: , Rfl:     insulin glargine (Lantus Solostar U-100 Insulin) 100 unit/mL (3 mL) pen, Inject 32 Units under the skin once daily at bedtime. (Patient taking differently: Inject 30 Units under the skin once daily at bedtime.), Disp: 15 mL, Rfl: 3    lisinopril 20 mg tablet, Take 1 tablet (20 mg) by mouth once daily. (Patient not taking: Reported on 1/30/2024), Disp: 90 tablet, Rfl: 3    metFORMIN  mg 24 hr tablet, Take 2 tablets (1,000 mg) by mouth 2 times a day with meals., Disp: 360 tablet, Rfl: 1    multivitamin-children's (Cerovite, Jr) chewable tablet, Chew 2 tablets once daily., Disp: , Rfl:     pantoprazole (ProtoNix) 40 mg EC tablet, Take 1 tablet (40 mg) by mouth once daily. Do not crush, chew, or split.,  Disp: 90 tablet, Rfl: 3    potassium chloride CR 10 mEq ER tablet, Take 3 tablets (30 mEq) by mouth 2 times a day., Disp: , Rfl:     sacubitriL-valsartan (Entresto) 24-26 mg tablet, Take 1 tablet by mouth 2 times a day., Disp: , Rfl:     semaglutide (Ozempic) 1 mg/dose (4 mg/3 mL) pen injector, Inject 1 mg under the skin 1 (one) time per week. (Patient taking differently: Inject 1 mg under the skin 1 (one) time per week. On Sundays), Disp: 3 mL, Rfl: 5    simvastatin (Zocor) 20 mg tablet, Take 1 tablet (20 mg) by mouth once daily at bedtime., Disp: , Rfl:     sotalol (Betapace) 80 mg tablet, Take 0.5 tablets (40 mg) by mouth twice a day., Disp: , Rfl:     spironolactone (Aldactone) 25 mg tablet, Take 2 tablets (50 mg) by mouth once daily., Disp: 90 tablet, Rfl: 3    torsemide (Demadex) 20 mg tablet, Take 3 tablets (60 mg) by mouth 2 times a day., Disp: 270 tablet, Rfl: 3    warfarin (Coumadin) 5 mg tablet, Take 1 tablet (5 mg) by mouth Mondays, Wednesdays, and Fridays. Take 0.5 tablet (2.5 mg) by mouth Sundays, Tuesdays, Thursdays, and Saturdays., Disp: , Rfl:      No Known Allergies     ECG 12 lead (Ancillary Performed)  Normal sinus rhythm  ST & T wave abnormality, consider inferior ischemia  ST & T wave abnormality, consider anterolateral ischemia  Prolonged QT  Abnormal ECG  When compared with ECG of 01-JUN-2020 09:32,  Significant changes have occurred       [unfilled]     There were no vitals taken for this visit.       Physical Exam  Constitutional:       Comments: Obese   HENT:      Head: Normocephalic and atraumatic.   Cardiovascular:      Rate and Rhythm: Normal rate and regular rhythm.   Pulmonary:      Effort: Pulmonary effort is normal.      Breath sounds: Normal breath sounds.   Abdominal:      Comments: Obese    Soft and nontender    No umbilical hernia     Vertical epigastric scar noted, hernial mass at superior apex of this, reducible, defect at least 2 or 3 cm   Musculoskeletal:         General:  Normal range of motion.      Cervical back: Normal range of motion.   Skin:     General: Skin is warm.   Neurological:      General: No focal deficit present.      Mental Status: He is oriented to person, place, and time.                  Assessment/    Incisional hernia    Atrial fibrillation  Anticoagulated state  Hypertension  Obesity  Diabetes    Plan/    Plan open repair under general anesthesia with mesh.  Patient be kept in the hospital overnight for observation    Preoperative cardiac clearance has been obtained.  Coumadin has been held.  Note made of no history of stenting or overt CHF obesity noted.  Patient understands he is at higher risk of poor healing infection and recurrence        Note made of diabetes, hypertension etc.  Plan to keep the patient in the hospital overnight.  Will need internal medicine assistance in the postoperative period    Risk benefits indications for surgery reviewed with the patient.  The potential bleeding infection MI PE death etc. reviewed.  The anticipated convalescence is reviewed.  Use of mesh and prosthetic materials is reviewed.  All questions were answered and consent is obtained

## 2024-02-14 ENCOUNTER — ANESTHESIA (OUTPATIENT)
Dept: OPERATING ROOM | Facility: HOSPITAL | Age: 64
End: 2024-02-14
Payer: COMMERCIAL

## 2024-02-15 LAB
ATRIAL RATE: 84 BPM
P AXIS: 50 DEGREES
P OFFSET: 168 MS
P ONSET: 110 MS
PR INTERVAL: 194 MS
Q ONSET: 207 MS
QRS COUNT: 14 BEATS
QRS DURATION: 102 MS
QT INTERVAL: 412 MS
QTC CALCULATION(BAZETT): 486 MS
QTC FREDERICIA: 460 MS
R AXIS: 19 DEGREES
T AXIS: 214 DEGREES
T OFFSET: 413 MS
VENTRICULAR RATE: 84 BPM

## 2024-02-27 ENCOUNTER — APPOINTMENT (OUTPATIENT)
Dept: SURGERY | Facility: CLINIC | Age: 64
End: 2024-02-27
Payer: COMMERCIAL

## 2024-03-11 DIAGNOSIS — E11.40 TYPE 2 DIABETES MELLITUS WITH DIABETIC NEUROPATHY, UNSPECIFIED (MULTI): ICD-10-CM

## 2024-03-11 RX ORDER — PEN NEEDLE, DIABETIC 32GX 5/32"
NEEDLE, DISPOSABLE MISCELLANEOUS
Qty: 100 EACH | Refills: 3 | Status: SHIPPED | OUTPATIENT
Start: 2024-03-11

## 2024-03-25 NOTE — PROGRESS NOTES
Department of Medicine  Division of Pulmonary, Critical Care, and Sleep Medicine  Follow-Up Visit  [facility] - [address], [address]    Physician HPI:  Mr. Sierra is a 63-year-old male with past medical history significant for morbid obesity status post gastric bypass surgery 7 years ago, severe obstructive sleep apnea on CPAP currently set at 13 cmH2O, hypertension, diabetes mellitus, A. fib status post ablation and hyperlipidemia who presented to the office today to establish care for obstructive sleep apnea.     Allen reports a stable respiratory status since last visit.  He is following up with cardiology regarding paroxysmal atrial fibrillation with RVR.  He reports having COVID-19 infection in December which triggered A-fib/RVR.  He is scheduled for a cardiac PET scan at the end of the month.  He has had an excellent compliance with CPAP.  90-day CPAP data is reviewed today.  Average AHI is 1.6/h.  He denies excessive daytime sleepiness or fatigue.    Follow up 4/3/2024:  Allen has an excellent compliance with CPAP with no acute symptoms today.  CPAP data is reviewed.      Immunization History   Administered Date(s) Administered    Flu vaccine (IIV4), preservative free *Check age/dose* 10/13/2018, 10/19/2019, 09/24/2020, 09/27/2021, 10/11/2022    Influenza, seasonal, injectable 11/14/2007, 10/08/2014    Influenza, seasonal, injectable, preservative free 10/12/2015    Pfizer COVID-19 vaccine, Fall 2023, 12 years and older, (30mcg/0.3mL) 10/17/2023    Pfizer COVID-19 vaccine, bivalent, age 12 years and older (30 mcg/0.3 mL) 10/11/2022    Pfizer Gray Cap SARS-CoV-2 05/15/2022    Pfizer Purple Cap SARS-CoV-2 02/04/2021, 02/26/2021, 10/18/2021    Pneumococcal polysaccharide vaccine, 23-valent, age 2 years and older (PNEUMOVAX 23) 12/17/2010    Tdap vaccine, age 7 year and older (BOOSTRIX, ADACEL) 08/29/2014, 01/01/2015, 07/31/2023    Zoster vaccine, recombinant, adult (SHINGRIX) 08/31/2018, 01/25/2019    Zoster,  "live 12/13/2015       Current Outpatient Medications   Medication Instructions    amiodarone (PACERONE) 200 mg, oral, Daily    ascorbic acid (VITAMIN C) 500 mg, oral, Daily    calcium carbonate-vitamin D3 600 mg-5 mcg (200 unit) tablet 1 tablet, oral, Daily    chlorhexidine (Peridex) 0.12 % solution Swish and spit 15 ml for 2 doses, 15mL the night before surgery and 15 ml morning of surgery - swish for 30 seconds -DO NOT SWALLOW, SPIT OUT    cholecalciferol (VITAMIN D-3) 50 mcg, oral, Daily    coenzyme Q-10 200 mg, oral, Daily    empagliflozin (JARDIANCE) 10 mg, oral, Daily with breakfast    ferrous sulfate (325 mg ferrous sulfate) 325 mg, oral, 2 times daily    FLUoxetine (PROZAC) 80 mg, oral, Daily    folic acid (FOLVITE) 1 mg, oral, Daily    Lantus Solostar U-100 Insulin 30 Units, subcutaneous, Nightly    lisinopril 20 mg, oral, Daily    metFORMIN XR (GLUCOPHAGE-XR) 1,000 mg, oral, 2 times daily with meals    multivitamin-children's (Cerovite, Jr) chewable tablet 2 tablets, oral, Daily RT    Ozempic 1 mg, subcutaneous, Once Weekly    pantoprazole (PROTONIX) 40 mg, oral, Daily before breakfast    pen needle, diabetic (BD Johanne 2nd Gen Pen Needle) 32 gauge x 5/32\" needle USE AS DIRECTED DAILY    potassium chloride CR 10 mEq ER tablet Take 2 tablets (20 mEq) by mouth 6 times a day.    sacubitriL-valsartan (Entresto) 24-26 mg tablet 1 tablet, oral, 2 times daily    simvastatin (Zocor) 20 mg tablet Take 1 tablet (20 mg) by mouth once daily at bedtime.    spironolactone (ALDACTONE) 50 mg, oral, Daily    torsemide (DEMADEX) 60 mg, oral, 2 times daily    warfarin (Coumadin) 5 mg tablet Take 1 tablet (5 mg) by mouth Mondays, Wednesdays, and Fridays. Take 0.5 tablet (2.5 mg) by mouth Sundays, Tuesdays, Thursdays, and Saturdays.        Allergies:  No Known Allergies       Visit Vitals  BP 99/63   Pulse 77   Wt 140 kg (308 lb)   SpO2 96%   BMI 42.96 kg/m²   Smoking Status Never   BSA 2.65 m²        Physical Exam  Vitals and " "nursing note reviewed.   Constitutional:       General: He is not in acute distress.     Appearance: Normal appearance.   HENT:      Head: Normocephalic and atraumatic.   Eyes:      Conjunctiva/sclera: Conjunctivae normal.   Cardiovascular:      Rate and Rhythm: Normal rate and regular rhythm.   Pulmonary:      Effort: Pulmonary effort is normal. No respiratory distress.      Breath sounds: Normal breath sounds.   Musculoskeletal:      Cervical back: Neck supple.   Skin:     Coloration: Skin is not jaundiced.   Neurological:      General: No focal deficit present.      Mental Status: He is alert and oriented to person, place, and time. Mental status is at baseline.   Psychiatric:         Mood and Affect: Mood normal.         Behavior: Behavior normal.         Judgment: Judgment normal.            Pulmonary Function Test Results     Pulmonary Functions Testing Results:    No results found for: \"FEV1\", \"FVC\", \"COT2WAD\", \"TLC\", \"DLCO\"      Chest Radiograph     No results found for this or any previous visit from the past 2000 days.      Echocardiogram     No results found for this or any previous visit from the past 365 days.       Chest CT Scan     No results found for this or any previous visit from the past 365 days.       Laboratory Studies     Lab Results   Component Value Date    WBC 6.7 01/30/2024    HGB 14.0 01/30/2024    HCT 44.7 01/30/2024    MCV 93 01/30/2024     01/30/2024      Lab Results   Component Value Date    GLUCOSE 122 (H) 01/30/2024    CALCIUM 9.1 01/30/2024     01/30/2024    K 3.8 01/30/2024    CO2 33 (H) 01/30/2024     01/30/2024    BUN 19 01/30/2024    CREATININE 0.96 01/30/2024      Lab Results   Component Value Date    ALT 25 07/31/2023    AST 23 07/31/2023    ALKPHOS 54 07/31/2023    BILITOT 0.8 01/30/2024        Protime   Date/Time Value Ref Range Status   01/30/2024 02:32 PM 27.0 (H) 9.8 - 12.8 seconds Final     INR   Date/Time Value Ref Range Status   01/30/2024 02:32 PM " "2.4 (H) 0.9 - 1.1 Final       No results found for: \"ICIGE\", \"IGE\", \"ICA04\", \"ASPFU\", \"IGG\", \"IGA\", \"IGM\"          Assessment and Plan / Recommendations     1. Morbid obesity s/p Gastric bypass surgery  2. Severe RENÉ on CPAP  3. HTN, DM and Hyperlipidemia  4. Hx of Afib s/p ablation      Recommend:  Continue on CPAP on current settings via nasal pillows.  F/U in 12 months        Fátima Fuchs MD   04/03/2024  "

## 2024-04-01 ENCOUNTER — OFFICE VISIT (OUTPATIENT)
Dept: ENDOCRINOLOGY | Facility: CLINIC | Age: 64
End: 2024-04-01
Payer: COMMERCIAL

## 2024-04-01 VITALS
SYSTOLIC BLOOD PRESSURE: 94 MMHG | BODY MASS INDEX: 42.84 KG/M2 | WEIGHT: 306 LBS | DIASTOLIC BLOOD PRESSURE: 62 MMHG | HEIGHT: 71 IN

## 2024-04-01 DIAGNOSIS — E78.5 HYPERLIPIDEMIA, UNSPECIFIED HYPERLIPIDEMIA TYPE: ICD-10-CM

## 2024-04-01 DIAGNOSIS — E66.01 MORBID OBESITY (MULTI): ICD-10-CM

## 2024-04-01 DIAGNOSIS — E11.3553 TYPE 2 DIABETES MELLITUS WITH STABLE PROLIFERATIVE RETINOPATHY OF BOTH EYES, WITH LONG-TERM CURRENT USE OF INSULIN (MULTI): Primary | ICD-10-CM

## 2024-04-01 DIAGNOSIS — Z79.4 TYPE 2 DIABETES MELLITUS WITH STABLE PROLIFERATIVE RETINOPATHY OF BOTH EYES, WITH LONG-TERM CURRENT USE OF INSULIN (MULTI): Primary | ICD-10-CM

## 2024-04-01 LAB
POC FINGERSTICK BLOOD GLUCOSE: 118 MG/DL (ref 70–100)
POC HEMOGLOBIN A1C: 7 % (ref 4.2–6.5)

## 2024-04-01 PROCEDURE — 82962 GLUCOSE BLOOD TEST: CPT | Performed by: HOSPITALIST

## 2024-04-01 PROCEDURE — 99214 OFFICE O/P EST MOD 30 MIN: CPT | Performed by: HOSPITALIST

## 2024-04-01 PROCEDURE — 3074F SYST BP LT 130 MM HG: CPT | Performed by: HOSPITALIST

## 2024-04-01 PROCEDURE — 3078F DIAST BP <80 MM HG: CPT | Performed by: HOSPITALIST

## 2024-04-01 PROCEDURE — 4010F ACE/ARB THERAPY RXD/TAKEN: CPT | Performed by: HOSPITALIST

## 2024-04-01 PROCEDURE — 83036 HEMOGLOBIN GLYCOSYLATED A1C: CPT | Performed by: HOSPITALIST

## 2024-04-01 RX ORDER — AMIODARONE HYDROCHLORIDE 200 MG/1
200 TABLET ORAL DAILY
COMMUNITY

## 2024-04-01 ASSESSMENT — ENCOUNTER SYMPTOMS
PHOTOPHOBIA: 0
VOICE CHANGE: 0
SHORTNESS OF BREATH: 0
ABDOMINAL PAIN: 0
PALPITATIONS: 0
FREQUENCY: 0
TREMORS: 0
NERVOUS/ANXIOUS: 0
LIGHT-HEADEDNESS: 0
ARTHRALGIAS: 0
SLEEP DISTURBANCE: 0
DIARRHEA: 0
VOMITING: 0
TROUBLE SWALLOWING: 0
HEADACHES: 0
ABDOMINAL DISTENTION: 0
EYE ITCHING: 0
DYSURIA: 0
CHEST TIGHTNESS: 0
CONSTIPATION: 0
CONSTITUTIONAL NEGATIVE: 1
SORE THROAT: 0
AGITATION: 0
NAUSEA: 0

## 2024-04-01 NOTE — PROGRESS NOTES
Subjective   Patient ID: Allen Sierra is a 63 y.o. male who presents for Diabetes (DM@ diagnosed  aprox 15 years ago, Family History mom and brothers, , PCC DR Goodwin, Sees podiatry and an eye doctor, tests every day,  Did not bring meter Last A1c = 7.3 on 1/31/23)   Lab Results   Component Value Date    HGBA1C 7.0 (A) 04/01/2024        HPI    Review of Systems   Constitutional: Negative.    HENT:  Negative for sore throat, trouble swallowing and voice change.    Eyes:  Negative for photophobia, itching and visual disturbance.   Respiratory:  Negative for chest tightness and shortness of breath.    Cardiovascular:  Negative for chest pain and palpitations.   Gastrointestinal:  Negative for abdominal distention, abdominal pain, constipation, diarrhea, nausea and vomiting.   Endocrine: Negative for cold intolerance, heat intolerance and polyuria.   Genitourinary:  Negative for dysuria and frequency.   Musculoskeletal:  Negative for arthralgias.   Skin:  Negative for pallor.   Allergic/Immunologic: Negative for environmental allergies.   Neurological:  Negative for tremors, light-headedness and headaches.   Psychiatric/Behavioral:  Negative for agitation and sleep disturbance. The patient is not nervous/anxious.        Objective   Physical Exam  Constitutional:       Appearance: Normal appearance.   HENT:      Head: Normocephalic.      Nose: Nose normal.      Mouth/Throat:      Mouth: Mucous membranes are moist.   Eyes:      Extraocular Movements: Extraocular movements intact.   Cardiovascular:      Rate and Rhythm: Normal rate.   Pulmonary:      Effort: Pulmonary effort is normal. No respiratory distress.   Abdominal:      General: There is no distension.      Comments: Incisional hernia    Musculoskeletal:         General: Normal range of motion.      Cervical back: Normal range of motion and neck supple.   Skin:     General: Skin is warm and dry.   Neurological:      Mental Status: He is alert and oriented to  "person, place, and time.   Psychiatric:         Mood and Affect: Mood normal.    Blood pressure 94/62, height 1.803 m (5' 11\"), weight 139 kg (306 lb).      Assessment/Plan   Diagnoses and all orders for this visit:  Type 2 diabetes mellitus with stable proliferative retinopathy of both eyes, with long-term current use of insulin (CMS/[facility])  -     POCT glycosylated hemoglobin (Hb A1C) manually resulted  -     POCT fingerstick glucose manually resulted  Morbid obesity (CMS/[facility])  Hyperlipidemia, unspecified hyperlipidemia type          T2 DM , uncontrolled - s/p gastric bypass surgery ~ 11 yrs ago   A1c improved from before and just at goal      he has made sig changes in diet and trying to eat better , lost ~ 10 lbs.   Basaglar 0-0-0-32  Oxempic 1 mg  mg weekly ( started 4/ 2023 )  metformin 1000 BID    Jardiance 10 mg ( restarted July 2023 - no more UTI after that )      JARDIANCE WAS DISCONTINUED IN JUNE 2023- given yeast infection and then UTI but restarted again in July 2023 after a CHF exacerbation      has some nausea with ozempic initially which resolved   BG - checks once a day , morning BG 90s-120s.      Lost 30 lbs on ozempic. Occ diarrhea/ constipation       A1c at goal now    PLAN:   - will hold off increasing ozempic more , will be carefull given h/o gastric bypass- RYB  - discussed pancreatitis/ cholecystitis risk.   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    Does swimming        "

## 2024-04-03 ENCOUNTER — OFFICE VISIT (OUTPATIENT)
Dept: PULMONOLOGY | Facility: CLINIC | Age: 64
End: 2024-04-03

## 2024-04-03 VITALS
BODY MASS INDEX: 42.96 KG/M2 | OXYGEN SATURATION: 96 % | SYSTOLIC BLOOD PRESSURE: 99 MMHG | HEART RATE: 77 BPM | DIASTOLIC BLOOD PRESSURE: 63 MMHG | WEIGHT: 308 LBS

## 2024-04-03 DIAGNOSIS — G47.33 OSA ON CPAP: Primary | ICD-10-CM

## 2024-04-03 PROCEDURE — 3078F DIAST BP <80 MM HG: CPT | Performed by: INTERNAL MEDICINE

## 2024-04-03 PROCEDURE — 3074F SYST BP LT 130 MM HG: CPT | Performed by: INTERNAL MEDICINE

## 2024-04-03 PROCEDURE — 99213 OFFICE O/P EST LOW 20 MIN: CPT | Performed by: INTERNAL MEDICINE

## 2024-04-03 PROCEDURE — 4010F ACE/ARB THERAPY RXD/TAKEN: CPT | Performed by: INTERNAL MEDICINE

## 2024-04-03 ASSESSMENT — COLUMBIA-SUICIDE SEVERITY RATING SCALE - C-SSRS
2. HAVE YOU ACTUALLY HAD ANY THOUGHTS OF KILLING YOURSELF?: NO
1. IN THE PAST MONTH, HAVE YOU WISHED YOU WERE DEAD OR WISHED YOU COULD GO TO SLEEP AND NOT WAKE UP?: NO
6. HAVE YOU EVER DONE ANYTHING, STARTED TO DO ANYTHING, OR PREPARED TO DO ANYTHING TO END YOUR LIFE?: YES
6. HAVE YOU EVER DONE ANYTHING, STARTED TO DO ANYTHING, OR PREPARED TO DO ANYTHING TO END YOUR LIFE?: NO

## 2024-04-03 ASSESSMENT — PATIENT HEALTH QUESTIONNAIRE - PHQ9
1. LITTLE INTEREST OR PLEASURE IN DOING THINGS: NOT AT ALL
2. FEELING DOWN, DEPRESSED OR HOPELESS: NOT AT ALL
SUM OF ALL RESPONSES TO PHQ9 QUESTIONS 1 AND 2: 0

## 2024-04-06 DIAGNOSIS — K21.9 GASTROESOPHAGEAL REFLUX DISEASE, UNSPECIFIED WHETHER ESOPHAGITIS PRESENT: ICD-10-CM

## 2024-04-06 DIAGNOSIS — Z79.4 TYPE 2 DIABETES MELLITUS WITH DIABETIC POLYNEUROPATHY, WITH LONG-TERM CURRENT USE OF INSULIN (MULTI): ICD-10-CM

## 2024-04-06 DIAGNOSIS — E11.42 TYPE 2 DIABETES MELLITUS WITH DIABETIC POLYNEUROPATHY, WITH LONG-TERM CURRENT USE OF INSULIN (MULTI): ICD-10-CM

## 2024-04-08 RX ORDER — SEMAGLUTIDE 0.68 MG/ML
INJECTION, SOLUTION SUBCUTANEOUS
Refills: 0 | OUTPATIENT
Start: 2024-04-08

## 2024-04-08 RX ORDER — PANTOPRAZOLE SODIUM 40 MG/1
40 TABLET, DELAYED RELEASE ORAL
Qty: 90 TABLET | Refills: 3 | Status: SHIPPED | OUTPATIENT
Start: 2024-04-08

## 2024-04-08 RX ORDER — SEMAGLUTIDE 1.34 MG/ML
INJECTION, SOLUTION SUBCUTANEOUS
Refills: 0 | OUTPATIENT
Start: 2024-04-08

## 2024-04-09 DIAGNOSIS — Z79.4 LONG TERM (CURRENT) USE OF INSULIN (MULTI): ICD-10-CM

## 2024-04-09 DIAGNOSIS — E11.3553: ICD-10-CM

## 2024-04-09 DIAGNOSIS — Z79.4 TYPE 2 DIABETES MELLITUS WITH DIABETIC POLYNEUROPATHY, WITH LONG-TERM CURRENT USE OF INSULIN (MULTI): ICD-10-CM

## 2024-04-09 DIAGNOSIS — E11.42 TYPE 2 DIABETES MELLITUS WITH DIABETIC POLYNEUROPATHY, WITH LONG-TERM CURRENT USE OF INSULIN (MULTI): ICD-10-CM

## 2024-04-09 RX ORDER — INSULIN GLARGINE 100 [IU]/ML
30 INJECTION, SOLUTION SUBCUTANEOUS NIGHTLY
Qty: 30 ML | Refills: 1 | Status: SHIPPED | OUTPATIENT
Start: 2024-04-09

## 2024-04-09 RX ORDER — METFORMIN HYDROCHLORIDE 500 MG/1
1000 TABLET, EXTENDED RELEASE ORAL
Qty: 360 TABLET | Refills: 1 | Status: SHIPPED | OUTPATIENT
Start: 2024-04-09

## 2024-04-12 ENCOUNTER — TELEPHONE (OUTPATIENT)
Dept: PRIMARY CARE | Facility: CLINIC | Age: 64
End: 2024-04-12
Payer: COMMERCIAL

## 2024-04-24 ENCOUNTER — TELEPHONE (OUTPATIENT)
Dept: ENDOCRINOLOGY | Facility: CLINIC | Age: 64
End: 2024-04-24
Payer: COMMERCIAL

## 2024-09-05 ENCOUNTER — APPOINTMENT (OUTPATIENT)
Dept: ENDOCRINOLOGY | Facility: CLINIC | Age: 64
End: 2024-09-05
Payer: COMMERCIAL

## 2024-09-05 VITALS
HEIGHT: 71 IN | SYSTOLIC BLOOD PRESSURE: 112 MMHG | BODY MASS INDEX: 42.28 KG/M2 | DIASTOLIC BLOOD PRESSURE: 70 MMHG | WEIGHT: 302 LBS

## 2024-09-05 DIAGNOSIS — E78.5 DYSLIPIDEMIA: ICD-10-CM

## 2024-09-05 DIAGNOSIS — E11.3553 TYPE 2 DIABETES MELLITUS WITH STABLE PROLIFERATIVE RETINOPATHY OF BOTH EYES, WITH LONG-TERM CURRENT USE OF INSULIN (MULTI): Primary | ICD-10-CM

## 2024-09-05 DIAGNOSIS — I10 HYPERTENSION, UNSPECIFIED TYPE: ICD-10-CM

## 2024-09-05 DIAGNOSIS — Z79.4 TYPE 2 DIABETES MELLITUS WITH STABLE PROLIFERATIVE RETINOPATHY OF BOTH EYES, WITH LONG-TERM CURRENT USE OF INSULIN (MULTI): Primary | ICD-10-CM

## 2024-09-05 LAB
POC FINGERSTICK BLOOD GLUCOSE: 180 MG/DL (ref 70–100)
POC HEMOGLOBIN A1C: 7.9 % (ref 4.2–6.5)

## 2024-09-05 PROCEDURE — 82962 GLUCOSE BLOOD TEST: CPT | Performed by: HOSPITALIST

## 2024-09-05 PROCEDURE — 99214 OFFICE O/P EST MOD 30 MIN: CPT | Performed by: HOSPITALIST

## 2024-09-05 PROCEDURE — 3078F DIAST BP <80 MM HG: CPT | Performed by: HOSPITALIST

## 2024-09-05 PROCEDURE — 3008F BODY MASS INDEX DOCD: CPT | Performed by: HOSPITALIST

## 2024-09-05 PROCEDURE — 83036 HEMOGLOBIN GLYCOSYLATED A1C: CPT | Performed by: HOSPITALIST

## 2024-09-05 PROCEDURE — 3074F SYST BP LT 130 MM HG: CPT | Performed by: HOSPITALIST

## 2024-09-05 PROCEDURE — 4010F ACE/ARB THERAPY RXD/TAKEN: CPT | Performed by: HOSPITALIST

## 2024-09-05 RX ORDER — POTASSIUM CHLORIDE 1500 MG/1
2 TABLET, EXTENDED RELEASE ORAL
COMMUNITY
Start: 2024-08-21

## 2024-09-05 RX ORDER — SPIRONOLACTONE 50 MG/1
1.5 TABLET, FILM COATED ORAL
COMMUNITY
Start: 2024-08-26

## 2024-09-05 ASSESSMENT — ENCOUNTER SYMPTOMS
ABDOMINAL PAIN: 0
LIGHT-HEADEDNESS: 0
ABDOMINAL DISTENTION: 0
PALPITATIONS: 0
CONSTIPATION: 0
SLEEP DISTURBANCE: 0
CONSTITUTIONAL NEGATIVE: 1
HEADACHES: 0
VOICE CHANGE: 0
SHORTNESS OF BREATH: 0
NERVOUS/ANXIOUS: 0
ARTHRALGIAS: 0
DYSURIA: 0
FREQUENCY: 0
PHOTOPHOBIA: 0
AGITATION: 0
EYE ITCHING: 0
TREMORS: 0
NAUSEA: 0
CHEST TIGHTNESS: 0
TROUBLE SWALLOWING: 0
VOMITING: 0
DIARRHEA: 0
SORE THROAT: 0

## 2024-09-05 NOTE — PROGRESS NOTES
Subjective   Patient ID: Allen Sierra is a 64 y.o. male who presents for Diabetes (Dm2 dx: 2008ish/PCP: [facility] /Podiatry: in [place] but might change /Eye exam: due 10/2024 for apt/Pt testing glucose once daily, did not bring meter.([employer] brand meter)/4/1/2024 hga1c 7.0%).  Lab Results   Component Value Date    HGBA1C 7.9 (A) 09/05/2024      HPI   See AP     Review of Systems   Constitutional: Negative.    HENT:  Negative for sore throat, trouble swallowing and voice change.    Eyes:  Negative for photophobia, itching and visual disturbance.   Respiratory:  Negative for chest tightness and shortness of breath.    Cardiovascular:  Negative for chest pain and palpitations.   Gastrointestinal:  Negative for abdominal distention, abdominal pain, constipation, diarrhea, nausea and vomiting.   Endocrine: Negative for cold intolerance, heat intolerance and polyuria.   Genitourinary:  Negative for dysuria and frequency.   Musculoskeletal:  Negative for arthralgias.   Skin:  Negative for pallor.   Allergic/Immunologic: Negative for environmental allergies.   Neurological:  Negative for tremors, light-headedness and headaches.   Psychiatric/Behavioral:  Negative for agitation and sleep disturbance. The patient is not nervous/anxious.        Objective   Physical Exam  Constitutional:       Appearance: Normal appearance.   HENT:      Head: Normocephalic.      Nose: Nose normal.      Mouth/Throat:      Mouth: Mucous membranes are moist.   Eyes:      Extraocular Movements: Extraocular movements intact.   Cardiovascular:      Rate and Rhythm: Normal rate.   Pulmonary:      Effort: Pulmonary effort is normal. No respiratory distress.   Abdominal:      General: There is no distension.   Musculoskeletal:         General: Normal range of motion.      Cervical back: Normal range of motion and neck supple.   Skin:     General: Skin is warm and dry.   Neurological:      Mental Status: He is alert and oriented to person, place, and  "time.   Psychiatric:         Mood and Affect: Mood normal.      Visit Vitals  /70   Ht 1.803 m (5' 11\")   Wt 137 kg (302 lb)   BMI 42.12 kg/m²   Smoking Status Never   BSA 2.62 m²        Assessment/Plan   Diagnoses and all orders for this visit:  Type 2 diabetes mellitus with stable proliferative retinopathy of both eyes, with long-term current use of insulin ([facility])  -     POCT glucose manually resulted  -     POCT glycosylated hemoglobin (Hb A1C) manually resulted  Dyslipidemia  Hypertension, unspecified type            T2 DM , uncontrolled - s/p gastric bypass surgery ~ 11 yrs ago   A1c improved from before and just at goal      he has made sig changes in diet and trying to eat better , lost ~ 10 lbs.   Basaglar 0-0-0-32  Oxempic 1 mg  mg weekly ( started 4/ 2023 )- off 5 weeks due to anticipated surgery- mild constipation  metformin 1000 BID    Jardiance 10 mg ( restarted July 2023 - no more UTI after that )      JARDIANCE WAS DISCONTINUED IN JUNE 2023- given yeast infection and then UTI but restarted again in July 2023 after a CHF exacerbation      has some nausea with ozempic initially which resolved   BG - checks once a day , morning -140s.      Lost 30 lbs on ozempic. Occ diarrhea/ constipation       A1c above goal      PLAN:   - resume ozempic   - no other change in regimen  - will hold off increasing ozempic more , will be carefull given h/o gastric bypass- RYB  - discussed pancreatitis/ cholecystitis risk.   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    Does swimming    2 dogs Merlin ( older) and Nicanor-  both are small dogs  "

## 2024-10-07 DIAGNOSIS — E11.42 TYPE 2 DIABETES MELLITUS WITH DIABETIC POLYNEUROPATHY, WITH LONG-TERM CURRENT USE OF INSULIN: ICD-10-CM

## 2024-10-07 DIAGNOSIS — Z79.4 TYPE 2 DIABETES MELLITUS WITH DIABETIC POLYNEUROPATHY, WITH LONG-TERM CURRENT USE OF INSULIN: ICD-10-CM

## 2024-10-07 RX ORDER — INSULIN GLARGINE 100 [IU]/ML
32 INJECTION, SOLUTION SUBCUTANEOUS NIGHTLY
Qty: 30 ML | Refills: 1 | Status: SHIPPED | OUTPATIENT
Start: 2024-10-07

## 2024-10-14 DIAGNOSIS — Z79.4 LONG TERM (CURRENT) USE OF INSULIN (MULTI): ICD-10-CM

## 2024-10-14 DIAGNOSIS — E11.3553 TYPE 2 DIABETES MELLITUS WITH STABLE PROLIFERATIVE DIABETIC RETINOPATHY, BILATERAL: ICD-10-CM

## 2024-10-14 RX ORDER — METFORMIN HYDROCHLORIDE 500 MG/1
1000 TABLET, EXTENDED RELEASE ORAL
Qty: 360 TABLET | Refills: 1 | Status: SHIPPED | OUTPATIENT
Start: 2024-10-14

## 2024-11-08 ENCOUNTER — APPOINTMENT (OUTPATIENT)
Dept: PRIMARY CARE | Facility: CLINIC | Age: 64
End: 2024-11-08
Payer: COMMERCIAL

## 2024-11-08 VITALS
WEIGHT: 300 LBS | SYSTOLIC BLOOD PRESSURE: 119 MMHG | DIASTOLIC BLOOD PRESSURE: 79 MMHG | RESPIRATION RATE: 18 BRPM | OXYGEN SATURATION: 98 % | BODY MASS INDEX: 41.84 KG/M2 | TEMPERATURE: 97.6 F | HEART RATE: 80 BPM

## 2024-11-08 DIAGNOSIS — I50.9 CONGESTIVE HEART FAILURE, UNSPECIFIED HF CHRONICITY, UNSPECIFIED HEART FAILURE TYPE: ICD-10-CM

## 2024-11-08 DIAGNOSIS — Z23 NEED FOR PNEUMOCOCCAL VACCINE: ICD-10-CM

## 2024-11-08 DIAGNOSIS — E78.5 HYPERLIPIDEMIA, UNSPECIFIED HYPERLIPIDEMIA TYPE: ICD-10-CM

## 2024-11-08 DIAGNOSIS — I48.91 ATRIAL FIBRILLATION, UNSPECIFIED TYPE (MULTI): Primary | ICD-10-CM

## 2024-11-08 PROCEDURE — 3078F DIAST BP <80 MM HG: CPT | Performed by: FAMILY MEDICINE

## 2024-11-08 PROCEDURE — 3074F SYST BP LT 130 MM HG: CPT | Performed by: FAMILY MEDICINE

## 2024-11-08 PROCEDURE — 90677 PCV20 VACCINE IM: CPT | Performed by: FAMILY MEDICINE

## 2024-11-08 PROCEDURE — 1036F TOBACCO NON-USER: CPT | Performed by: FAMILY MEDICINE

## 2024-11-08 PROCEDURE — 4010F ACE/ARB THERAPY RXD/TAKEN: CPT | Performed by: FAMILY MEDICINE

## 2024-11-08 PROCEDURE — 90471 IMMUNIZATION ADMIN: CPT | Performed by: FAMILY MEDICINE

## 2024-11-08 PROCEDURE — 99214 OFFICE O/P EST MOD 30 MIN: CPT | Performed by: FAMILY MEDICINE

## 2024-11-08 ASSESSMENT — ENCOUNTER SYMPTOMS
SHORTNESS OF BREATH: 0
HYPERTENSION: 1
HEADACHES: 0

## 2024-11-08 NOTE — ASSESSMENT & PLAN NOTE
S/p [facility] for prescheduled AV node ablation on 10/14/24 for hx of persistent atrial fib with RVR and underlined chronic HFrEF .  He is doing well.  He denies chest pain, palpitations, shortness of breath.  He sees [facility] EP, Dr. Karen Caro, and heart failure specialist, Dr. Dubose.      He had a pacemaker placed on 9/30/24.

## 2024-11-08 NOTE — PROGRESS NOTES
Subjective     Allen Sierra is a 64 y.o. male who presents for Hypertension.    Hypertension  Associated symptoms include anxiety. Pertinent negatives include no chest pain, headaches or shortness of breath.        Pt was seen at [facility] for prescheduled AV node ablation on 10/14/24 for hx of persistent atrial fib with RVR and underlined chronic HFrEF .  He is doing well.  He denies chest pain, palpitations, shortness of breath.  He sees [facility] EP, Dr. Karen Caro, and heart failure specialist, Dr. Dubose.      He had a pacemaker placed on 9/30/24.      He sees  endo, hx of IDDM, on insulin, ozempic, metformin.  He has lost about 40-45 lbs since last [facility] 2023 [facility]      Review of Systems   Respiratory:  Negative for shortness of breath.    Cardiovascular:  Negative for chest pain.   Neurological:  Negative for headaches.       Objective     Vitals:    11/08/24 1125   BP: 119/79   BP Location: Right arm   Patient Position: Sitting   Pulse: 80   Resp: 18   Temp: 36.4 °C (97.6 °F)   TempSrc: Temporal   SpO2: 98%   Weight: 136 kg (300 lb)        Current Outpatient Medications   Medication Instructions    ascorbic acid (VITAMIN C) 500 mg, Daily    calcium carbonate-vitamin D3 600 mg-5 mcg (200 unit) tablet 1 tablet, Daily    coenzyme Q-10 200 mg, Daily    empagliflozin (JARDIANCE) 10 mg, Daily with breakfast    ferrous sulfate (325 mg ferrous sulfate) 325 mg, 2 times daily    FLUoxetine (PROZAC) 80 mg, Daily    folic acid (FOLVITE) 1 mg, Daily    Klor-Con M20 20 mEq ER tablet 2 tablets, Every 12 hours scheduled (0630,1830)    Lantus Solostar U-100 Insulin 32 Units, subcutaneous, Nightly    lisinopril 20 mg, oral, Daily    metFORMIN XR (GLUCOPHAGE-XR) 1,000 mg, oral, 2 times daily (morning and late afternoon)    multivitamin-children's (Cerovite, Jr) chewable tablet 2 tablets, Daily RT    Ozempic 1 mg, subcutaneous, Once Weekly    pantoprazole (PROTONIX) 40 mg, oral, Daily before  "breakfast    pen needle, diabetic (BD Johanne 2nd Gen Pen Needle) 32 gauge x 5/32\" needle USE AS DIRECTED DAILY    sacubitriL-valsartan (Entresto) 24-26 mg tablet 1 tablet, 2 times daily    simvastatin (Zocor) 20 mg tablet Take 1 tablet (20 mg) by mouth once daily at bedtime.    spironolactone (Aldactone) 50 mg tablet 1.5 tablets, Daily (0630)    torsemide (DEMADEX) 60 mg, 2 times daily    warfarin (Coumadin) 5 mg tablet Take 1 tablet (5 mg) by mouth Mondays, Wednesdays, and Fridays. Take 0.5 tablet (2.5 mg) by mouth Sundays, Tuesdays, Thursdays, and Saturdays.        No Known Allergies     Past Surgical History:   Procedure Laterality Date    BARIATRIC SURGERY      CARDIAC SURGERY      MAZE procedure - 3/2023 -[facility] cardio    OTHER SURGICAL HISTORY  06/25/2019    Rotator cuff repair    OTHER SURGICAL HISTORY  06/25/2019    Hernia repair    OTHER SURGICAL HISTORY  09/27/2021    Colonoscopy    OTHER SURGICAL HISTORY  09/27/2021    Ablation    OTHER SURGICAL HISTORY  11/04/2021    Gastric bypass surgery    ROTATOR CUFF REPAIR          Social History     Tobacco Use    Smoking status: Never    Smokeless tobacco: Never   Vaping Use    Vaping status: Never Used   Substance Use Topics    Alcohol use: Not Currently    Drug use: Not Currently        Family History   Problem Relation Name Age of Onset    Heart failure Mother      Diabetes Mother      Hypertension Mother      Heart failure Father      Heart disease Brother      Diabetes Brother      Hypertension Brother          Immunization History   Administered Date(s) Administered    Flu vaccine (IIV4), preservative free *Check age/dose* 10/13/2018, 10/19/2019, 09/24/2020, 09/27/2021, 10/11/2022, 09/29/2023    Flu vaccine, trivalent, preservative free, age 6 months and greater (Fluarix/Fluzone/Flulaval) 10/12/2015    Flu vaccine, trivalent, preservative free, no egg protein, age 6 months or greater (Flucelvax) 09/21/2024    Influenza, Unspecified 10/11/2022    Influenza, " seasonal, injectable 11/14/2007, 10/08/2014    Pfizer COVID-19 vaccine, 12 years and older, (30mcg/0.3mL) (Comirnaty) 10/17/2023, 09/21/2024    Pfizer COVID-19 vaccine, bivalent, age 12 years and older (30 mcg/0.3 mL) 10/11/2022    Pfizer Gray Cap SARS-CoV-2 05/15/2022    Pfizer Purple Cap SARS-CoV-2 02/04/2021, 02/26/2021, 10/18/2021    Pneumococcal conjugate vaccine, 20-valent (PREVNAR 20) 11/08/2024    Pneumococcal polysaccharide vaccine, 23-valent, age 2 years and older (PNEUMOVAX 23) 12/17/2010    RSV, 60 Years And Older (AREXVY) 01/08/2024    Tdap vaccine, age 7 year and older (BOOSTRIX, ADACEL) 08/29/2014, 01/01/2015, 07/31/2023    Zoster vaccine, recombinant, adult (SHINGRIX) 08/31/2018, 01/25/2019    Zoster, live 12/13/2015        Physical Exam  Vitals reviewed.   Constitutional:       General: He is not in acute distress.     Appearance: Normal appearance. He is well-developed. He is obese.   HENT:      Head: Normocephalic and atraumatic.   Eyes:      General: Lids are normal.      Conjunctiva/sclera:      Right eye: Right conjunctiva is not injected.      Left eye: Left conjunctiva is not injected.   Cardiovascular:      Rate and Rhythm: Normal rate and regular rhythm.      Heart sounds: No murmur heard.  Pulmonary:      Effort: Pulmonary effort is normal. No respiratory distress.      Breath sounds: Normal breath sounds. No wheezing, rhonchi or rales.   Skin:     General: Skin is warm and dry.      Findings: No rash.   Neurological:      Mental Status: He is alert and oriented to person, place, and time. Mental status is at baseline.   Psychiatric:         Mood and Affect: Mood normal.         Behavior: Behavior normal.         Assessment & Plan  Atrial fibrillation, unspecified type (Multi)  S/p [facility] [place] Hospitalization for prescheduled AV node ablation on 10/14/24 for hx of persistent atrial fib with RVR and underlined chronic HFrEF .  He is doing well.  He denies chest pain, palpitations,  shortness of breath.  He sees [facility] EP, Dr. Karen Caro, and heart failure specialist, Dr. Dubose.      He had a pacemaker placed on 9/30/24.         Congestive heart failure, unspecified HF chronicity, unspecified heart failure type         Hyperlipidemia, unspecified hyperlipidemia type    Orders:    Lipid Panel; Future    Need for pneumococcal vaccine    Orders:    Pneumococcal conjugate vaccine, 20-valent (PREVNAR 20)

## 2024-11-22 ENCOUNTER — LAB (OUTPATIENT)
Dept: LAB | Facility: LAB | Age: 64
End: 2024-11-22
Payer: COMMERCIAL

## 2024-11-22 DIAGNOSIS — E78.5 HYPERLIPIDEMIA, UNSPECIFIED HYPERLIPIDEMIA TYPE: ICD-10-CM

## 2024-11-22 LAB
CHOLEST SERPL-MCNC: 149 MG/DL (ref 0–199)
CHOLESTEROL/HDL RATIO: 2.4
HDLC SERPL-MCNC: 61.8 MG/DL
LDLC SERPL CALC-MCNC: 67 MG/DL
NON HDL CHOLESTEROL: 87 MG/DL (ref 0–149)
TRIGL SERPL-MCNC: 101 MG/DL (ref 0–149)
VLDL: 20 MG/DL (ref 0–40)

## 2024-11-22 PROCEDURE — 36415 COLL VENOUS BLD VENIPUNCTURE: CPT

## 2024-11-22 PROCEDURE — 80061 LIPID PANEL: CPT

## 2024-12-03 ENCOUNTER — OFFICE VISIT (OUTPATIENT)
Facility: CLINIC | Age: 64
End: 2024-12-03
Payer: COMMERCIAL

## 2024-12-03 VITALS
OXYGEN SATURATION: 98 % | WEIGHT: 305 LBS | SYSTOLIC BLOOD PRESSURE: 117 MMHG | HEART RATE: 70 BPM | TEMPERATURE: 97.5 F | DIASTOLIC BLOOD PRESSURE: 74 MMHG | BODY MASS INDEX: 42.54 KG/M2 | RESPIRATION RATE: 18 BRPM

## 2024-12-03 DIAGNOSIS — J01.90 ACUTE BACTERIAL SINUSITIS: Primary | ICD-10-CM

## 2024-12-03 DIAGNOSIS — B96.89 ACUTE BACTERIAL SINUSITIS: Primary | ICD-10-CM

## 2024-12-03 PROCEDURE — 3048F LDL-C <100 MG/DL: CPT | Performed by: FAMILY MEDICINE

## 2024-12-03 PROCEDURE — 1036F TOBACCO NON-USER: CPT | Performed by: FAMILY MEDICINE

## 2024-12-03 PROCEDURE — 99213 OFFICE O/P EST LOW 20 MIN: CPT | Performed by: FAMILY MEDICINE

## 2024-12-03 PROCEDURE — 3078F DIAST BP <80 MM HG: CPT | Performed by: FAMILY MEDICINE

## 2024-12-03 PROCEDURE — 3074F SYST BP LT 130 MM HG: CPT | Performed by: FAMILY MEDICINE

## 2024-12-03 PROCEDURE — 4010F ACE/ARB THERAPY RXD/TAKEN: CPT | Performed by: FAMILY MEDICINE

## 2024-12-03 RX ORDER — AMOXICILLIN AND CLAVULANATE POTASSIUM 875; 125 MG/1; MG/1
875 TABLET, FILM COATED ORAL 2 TIMES DAILY
Qty: 20 TABLET | Refills: 0 | Status: SHIPPED | OUTPATIENT
Start: 2024-12-03

## 2024-12-03 ASSESSMENT — ENCOUNTER SYMPTOMS
SINUS COMPLAINT: 1
HEADACHES: 0
SHORTNESS OF BREATH: 0

## 2024-12-03 NOTE — PROGRESS NOTES
"Subjective     Allen Sierra is a 64 y.o. male who presents for Sinus Problem.    Sinus Problem  Pertinent negatives include no headaches or shortness of breath.      Pt reports left sided sinus/facial pain which started approximately 4 days ago.  He also has green nasal drainage.  No fevers or chills.  No cough.  Mild throat irritation.  He did a recent rapid covid test two days ago which was negative.    No antibiotic allergies.    He uses flonase daily for hx of allergic rhinitis.      Review of Systems   Respiratory:  Negative for shortness of breath.    Cardiovascular:  Negative for chest pain.   Neurological:  Negative for headaches.       Objective     Vitals:    12/03/24 1246   BP: 117/74   BP Location: Left arm   Patient Position: Sitting   Pulse: 70   Resp: 18   Temp: 36.4 °C (97.5 °F)   TempSrc: Temporal   SpO2: 98%   Weight: 138 kg (305 lb)        Current Outpatient Medications   Medication Instructions    amoxicillin-pot clavulanate (Augmentin) 875-125 mg tablet 875 mg, oral, 2 times daily    ascorbic acid (VITAMIN C) 500 mg, Daily    calcium carbonate-vitamin D3 600 mg-5 mcg (200 unit) tablet 1 tablet, Daily    coenzyme Q-10 200 mg, Daily    empagliflozin (JARDIANCE) 10 mg, Daily with breakfast    ferrous sulfate (325 mg ferrous sulfate) 325 mg, 2 times daily    FLUoxetine (PROZAC) 80 mg, Daily    folic acid (FOLVITE) 1 mg, Daily    Klor-Con M20 20 mEq ER tablet 2 tablets, Every 12 hours scheduled (0630,1830)    Lantus Solostar U-100 Insulin 32 Units, subcutaneous, Nightly    lisinopril 20 mg, oral, Daily    metFORMIN XR (GLUCOPHAGE-XR) 1,000 mg, oral, 2 times daily (morning and late afternoon)    multivitamin-children's (Cerovite, Jr) chewable tablet 2 tablets, Daily RT    Ozempic 1 mg, subcutaneous, Once Weekly    pantoprazole (PROTONIX) 40 mg, oral, Daily before breakfast    pen needle, diabetic (BD Johanne 2nd Gen Pen Needle) 32 gauge x 5/32\" needle USE AS DIRECTED DAILY    sacubitriL-valsartan " (Entresto) 24-26 mg tablet 1 tablet, 2 times daily    simvastatin (Zocor) 20 mg tablet Take 1 tablet (20 mg) by mouth once daily at bedtime.    spironolactone (Aldactone) 50 mg tablet 1.5 tablets, Daily (0630)    torsemide (DEMADEX) 60 mg, 2 times daily    warfarin (Coumadin) 5 mg tablet Take 1 tablet (5 mg) by mouth Mondays, Wednesdays, and Fridays. Take 0.5 tablet (2.5 mg) by mouth Sundays, Tuesdays, Thursdays, and Saturdays.        No Known Allergies     Past Surgical History:   Procedure Laterality Date    BARIATRIC SURGERY      CARDIAC SURGERY      MAZE procedure - 3/2023 -[facility] cardio    OTHER SURGICAL HISTORY  06/25/2019    Rotator cuff repair    OTHER SURGICAL HISTORY  06/25/2019    Hernia repair    OTHER SURGICAL HISTORY  09/27/2021    Colonoscopy    OTHER SURGICAL HISTORY  09/27/2021    Ablation    OTHER SURGICAL HISTORY  11/04/2021    Gastric bypass surgery    ROTATOR CUFF REPAIR          Social History     Tobacco Use    Smoking status: Never    Smokeless tobacco: Never   Vaping Use    Vaping status: Never Used   Substance Use Topics    Alcohol use: Not Currently    Drug use: Not Currently        Family History   Problem Relation Name Age of Onset    Heart failure Mother      Diabetes Mother      Hypertension Mother      Heart failure Father      Heart disease Brother      Diabetes Brother      Hypertension Brother          Immunization History   Administered Date(s) Administered    Flu vaccine (IIV4), preservative free *Check age/dose* 10/13/2018, 10/19/2019, 09/24/2020, 09/27/2021, 10/11/2022, 09/29/2023    Flu vaccine, trivalent, preservative free, age 6 months and greater (Fluarix/Fluzone/Flulaval) 10/12/2015    Flu vaccine, trivalent, preservative free, no egg protein, age 6 months or greater (Flucelvax) 09/21/2024    Influenza, Unspecified 10/11/2022    Influenza, seasonal, injectable 11/14/2007, 10/08/2014    Pfizer COVID-19 vaccine, 12 years and older, (30mcg/0.3mL) (Comirnaty) 10/17/2023,  09/21/2024    Pfizer COVID-19 vaccine, bivalent, age 12 years and older (30 mcg/0.3 mL) 10/11/2022    Pfizer Gray Cap SARS-CoV-2 05/15/2022    Pfizer Purple Cap SARS-CoV-2 02/04/2021, 02/26/2021, 10/18/2021    Pneumococcal conjugate vaccine, 20-valent (PREVNAR 20) 11/08/2024    Pneumococcal polysaccharide vaccine, 23-valent, age 2 years and older (PNEUMOVAX 23) 12/17/2010    RSV, 60 Years And Older (AREXVY) 01/08/2024    Tdap vaccine, age 7 year and older (BOOSTRIX, ADACEL) 08/29/2014, 01/01/2015, 07/31/2023    Zoster vaccine, recombinant, adult (SHINGRIX) 08/31/2018, 01/25/2019    Zoster, live 12/13/2015        Physical Exam  Constitutional:       General: He is not in acute distress.     Appearance: He is not toxic-appearing.   HENT:      Head: Normocephalic and atraumatic.      Comments: Left maxillary sinus ttp.       Right Ear: Tympanic membrane, ear canal and external ear normal.      Left Ear: Tympanic membrane, ear canal and external ear normal.      Nose: Congestion present. No rhinorrhea.      Mouth/Throat:      Mouth: Mucous membranes are moist.      Pharynx: Oropharynx is clear. No oropharyngeal exudate or posterior oropharyngeal erythema.   Eyes:      Conjunctiva/sclera: Conjunctivae normal.   Cardiovascular:      Rate and Rhythm: Normal rate and regular rhythm.   Pulmonary:      Effort: Pulmonary effort is normal. No respiratory distress.      Breath sounds: Normal breath sounds. No wheezing, rhonchi or rales.   Lymphadenopathy:      Cervical: No cervical adenopathy.   Skin:     General: Skin is warm and dry.      Findings: No rash.   Neurological:      Mental Status: He is alert and oriented to person, place, and time. Mental status is at baseline.   Psychiatric:         Mood and Affect: Mood normal.         Behavior: Behavior normal.         Assessment & Plan  Acute bacterial sinusitis  Left maxillary region - treat with augmentin BID x 10 days.  Pt aware to have INR checked in a few days due to  being on an antibiotic.  He will be seen by his coumadin clinic in a few days.  Follow up if no improvement or if symptoms worsen.        Orders:    amoxicillin-pot clavulanate (Augmentin) 875-125 mg tablet; Take 1 tablet (875 mg) by mouth 2 times a day.

## 2025-01-03 ENCOUNTER — TELEPHONE (OUTPATIENT)
Facility: CLINIC | Age: 65
End: 2025-01-03
Payer: COMMERCIAL

## 2025-01-03 ENCOUNTER — TELEPHONE (OUTPATIENT)
Dept: ENDOCRINOLOGY | Facility: CLINIC | Age: 65
End: 2025-01-03
Payer: COMMERCIAL

## 2025-01-03 DIAGNOSIS — E11.42 TYPE 2 DIABETES MELLITUS WITH DIABETIC POLYNEUROPATHY, WITH LONG-TERM CURRENT USE OF INSULIN: ICD-10-CM

## 2025-01-03 DIAGNOSIS — Z79.4 LONG TERM (CURRENT) USE OF INSULIN (MULTI): ICD-10-CM

## 2025-01-03 DIAGNOSIS — E11.3553 TYPE 2 DIABETES MELLITUS WITH STABLE PROLIFERATIVE DIABETIC RETINOPATHY, BILATERAL: ICD-10-CM

## 2025-01-03 DIAGNOSIS — K21.9 GASTROESOPHAGEAL REFLUX DISEASE, UNSPECIFIED WHETHER ESOPHAGITIS PRESENT: ICD-10-CM

## 2025-01-03 DIAGNOSIS — Z79.4 TYPE 2 DIABETES MELLITUS WITH DIABETIC POLYNEUROPATHY, WITH LONG-TERM CURRENT USE OF INSULIN: ICD-10-CM

## 2025-01-03 RX ORDER — PANTOPRAZOLE SODIUM 40 MG/1
40 TABLET, DELAYED RELEASE ORAL
Qty: 90 TABLET | Refills: 0 | Status: SHIPPED | OUTPATIENT
Start: 2025-01-03

## 2025-01-03 RX ORDER — METFORMIN HYDROCHLORIDE 500 MG/1
1000 TABLET, EXTENDED RELEASE ORAL
Qty: 360 TABLET | Refills: 0 | Status: SHIPPED | OUTPATIENT
Start: 2025-01-03

## 2025-01-03 RX ORDER — SEMAGLUTIDE 1.34 MG/ML
1 INJECTION, SOLUTION SUBCUTANEOUS
Qty: 9 ML | Refills: 0 | Status: SHIPPED | OUTPATIENT
Start: 2025-01-05

## 2025-01-03 NOTE — TELEPHONE ENCOUNTER
Patient insurance changed and needs new order for Cpap supplies to go to [facility]. Can you please order.

## 2025-01-03 NOTE — TELEPHONE ENCOUNTER
Allen called 1-3-25 looking for a refill on his Lantus.  The insurance will  No longer cover the Lantus and the other options are Treisba or Basaglar.  Please advise.  This goes to his mail order pharmacy  [facility].  Thank you

## 2025-01-03 NOTE — TELEPHONE ENCOUNTER
Allen called on the  1-3-25.  He needs a refill on his Ozempic 1 mg -  And his Metformin 500mg XR - he takes 2 tabs 2 x a day.  Please send  To his new mail order [facility].  Thank you

## 2025-01-17 DIAGNOSIS — G47.33 OSA ON CPAP: Primary | ICD-10-CM

## 2025-02-03 ENCOUNTER — TELEPHONE (OUTPATIENT)
Dept: ENDOCRINOLOGY | Facility: CLINIC | Age: 65
End: 2025-02-03
Payer: COMMERCIAL

## 2025-02-03 NOTE — TELEPHONE ENCOUNTER
This patient called in, he's having a Virtual appointment this Friday and was hoping to have a HemA1c done prior, no existing order is in the system.   show

## 2025-02-04 DIAGNOSIS — Z79.4 TYPE 2 DIABETES MELLITUS WITH DIABETIC POLYNEUROPATHY, WITH LONG-TERM CURRENT USE OF INSULIN: Primary | ICD-10-CM

## 2025-02-04 DIAGNOSIS — E11.42 TYPE 2 DIABETES MELLITUS WITH DIABETIC POLYNEUROPATHY, WITH LONG-TERM CURRENT USE OF INSULIN: Primary | ICD-10-CM

## 2025-02-05 LAB
ALBUMIN SERPL-MCNC: 4 G/DL (ref 3.6–5.1)
ALP SERPL-CCNC: 54 U/L (ref 35–144)
ALT SERPL-CCNC: 18 U/L (ref 9–46)
ANION GAP SERPL CALCULATED.4IONS-SCNC: 12 MMOL/L (CALC) (ref 7–17)
AST SERPL-CCNC: 18 U/L (ref 10–35)
BILIRUB SERPL-MCNC: 0.6 MG/DL (ref 0.2–1.2)
BUN SERPL-MCNC: 22 MG/DL (ref 7–25)
CALCIUM SERPL-MCNC: 8.9 MG/DL (ref 8.6–10.3)
CHLORIDE SERPL-SCNC: 101 MMOL/L (ref 98–110)
CO2 SERPL-SCNC: 26 MMOL/L (ref 20–32)
CREAT SERPL-MCNC: 0.98 MG/DL (ref 0.7–1.35)
EGFRCR SERPLBLD CKD-EPI 2021: 87 ML/MIN/1.73M2
EST. AVERAGE GLUCOSE BLD GHB EST-MCNC: 197 MG/DL
EST. AVERAGE GLUCOSE BLD GHB EST-SCNC: 10.9 MMOL/L
GLUCOSE SERPL-MCNC: 194 MG/DL (ref 65–139)
HBA1C MFR BLD: 8.5 % OF TOTAL HGB
POTASSIUM SERPL-SCNC: 4.3 MMOL/L (ref 3.5–5.3)
PROT SERPL-MCNC: 6.3 G/DL (ref 6.1–8.1)
SODIUM SERPL-SCNC: 139 MMOL/L (ref 135–146)

## 2025-02-07 ENCOUNTER — APPOINTMENT (OUTPATIENT)
Dept: ENDOCRINOLOGY | Facility: CLINIC | Age: 65
End: 2025-02-07
Payer: COMMERCIAL

## 2025-02-07 DIAGNOSIS — E11.3553 TYPE 2 DIABETES MELLITUS WITH STABLE PROLIFERATIVE DIABETIC RETINOPATHY, BILATERAL: ICD-10-CM

## 2025-02-07 DIAGNOSIS — E66.01 MORBID OBESITY (MULTI): ICD-10-CM

## 2025-02-07 DIAGNOSIS — Z79.4 TYPE 2 DIABETES MELLITUS WITH DIABETIC POLYNEUROPATHY, WITH LONG-TERM CURRENT USE OF INSULIN: Primary | ICD-10-CM

## 2025-02-07 DIAGNOSIS — E11.42 TYPE 2 DIABETES MELLITUS WITH DIABETIC POLYNEUROPATHY, WITH LONG-TERM CURRENT USE OF INSULIN: Primary | ICD-10-CM

## 2025-02-07 DIAGNOSIS — E11.42 TYPE 2 DIABETES MELLITUS WITH DIABETIC POLYNEUROPATHY, WITH LONG-TERM CURRENT USE OF INSULIN: ICD-10-CM

## 2025-02-07 DIAGNOSIS — Z79.4 LONG TERM (CURRENT) USE OF INSULIN (MULTI): ICD-10-CM

## 2025-02-07 DIAGNOSIS — Z79.4 TYPE 2 DIABETES MELLITUS WITH DIABETIC POLYNEUROPATHY, WITH LONG-TERM CURRENT USE OF INSULIN: ICD-10-CM

## 2025-02-07 PROCEDURE — 99214 OFFICE O/P EST MOD 30 MIN: CPT | Performed by: HOSPITALIST

## 2025-02-07 PROCEDURE — 4010F ACE/ARB THERAPY RXD/TAKEN: CPT | Performed by: HOSPITALIST

## 2025-02-07 RX ORDER — INSULIN GLARGINE 100 [IU]/ML
34 INJECTION, SOLUTION SUBCUTANEOUS NIGHTLY
Qty: 30 ML | Refills: 2 | Status: SHIPPED | OUTPATIENT
Start: 2025-02-07 | End: 2025-02-07

## 2025-02-07 RX ORDER — INSULIN GLARGINE 100 [IU]/ML
34 INJECTION, SOLUTION SUBCUTANEOUS NIGHTLY
Qty: 30 ML | Refills: 2 | Status: SHIPPED | OUTPATIENT
Start: 2025-02-07 | End: 2025-10-30

## 2025-02-07 RX ORDER — INSULIN GLARGINE 100 [IU]/ML
34 INJECTION, SOLUTION SUBCUTANEOUS NIGHTLY
Qty: 30 ML | Refills: 2 | Status: SHIPPED | OUTPATIENT
Start: 2025-02-07 | End: 2025-02-07 | Stop reason: SDUPTHER

## 2025-02-07 ASSESSMENT — ENCOUNTER SYMPTOMS
ARTHRALGIAS: 0
CONSTITUTIONAL NEGATIVE: 1
PHOTOPHOBIA: 0
ABDOMINAL DISTENTION: 0
CHEST TIGHTNESS: 0
VOMITING: 0
DYSURIA: 0
SHORTNESS OF BREATH: 0
DIARRHEA: 0
ABDOMINAL PAIN: 0
LIGHT-HEADEDNESS: 0
TROUBLE SWALLOWING: 0
FREQUENCY: 0
HEADACHES: 0
VOICE CHANGE: 0
SORE THROAT: 0
AGITATION: 0
SLEEP DISTURBANCE: 0
PALPITATIONS: 0
NAUSEA: 0
CONSTIPATION: 0
TREMORS: 0
EYE ITCHING: 0
NERVOUS/ANXIOUS: 0

## 2025-02-07 NOTE — PROGRESS NOTES
Subjective   Patient ID: Allen Sierra is a 64 y.o. male who presents for Diabetes (VIRTUAL VISIT::/Dm2 dx: 2008ish/PCP: [facility] /Podiatry: in [place] but might change /Eye exam: due 10/2024 for apt/Pt testing glucose once daily,[place] meter.).  Lab Results   Component Value Date    HGBA1C 8.5 (H) 02/04/2025      HPI   See AP     Review of Systems   Constitutional: Negative.    HENT:  Negative for sore throat, trouble swallowing and voice change.    Eyes:  Negative for photophobia, itching and visual disturbance.   Respiratory:  Negative for chest tightness and shortness of breath.    Cardiovascular:  Negative for chest pain and palpitations.   Gastrointestinal:  Negative for abdominal distention, abdominal pain, constipation, diarrhea, nausea and vomiting.   Endocrine: Negative for cold intolerance, heat intolerance and polyuria.   Genitourinary:  Negative for dysuria and frequency.   Musculoskeletal:  Negative for arthralgias.   Skin:  Negative for pallor.   Allergic/Immunologic: Negative for environmental allergies.   Neurological:  Negative for tremors, light-headedness and headaches.   Psychiatric/Behavioral:  Negative for agitation and sleep disturbance. The patient is not nervous/anxious.        Objective   Physical Exam NO vitals due to virtual visit      NAD   Aaox3   EOM nml   Mood appropriate     Assessment/Plan   Diagnoses and all orders for this visit:  Type 2 diabetes mellitus with diabetic polyneuropathy, with long-term current use of insulin  -     Comprehensive metabolic panel; Future  -     Albumin-Creatinine Ratio, Urine Random; Future  -     Hemoglobin A1c; Future  -     Basaglar KwikPen U-100 Insulin 100 unit/mL (3 mL) pen; Inject 34 Units under the skin once daily at bedtime. Inject 32 Units under the skin once daily at bedtime.  Morbid obesity (Multi)  Type 2 diabetes mellitus with stable proliferative diabetic retinopathy, bilateral  -     Basaglar KwikPen U-100 Insulin 100 unit/mL (3  mL) pen; Inject 34 Units under the skin once daily at bedtime. Inject 32 Units under the skin once daily at bedtime.  Long term (current) use of insulin (Multi)  -     Basaglar KwikPen U-100 Insulin 100 unit/mL (3 mL) pen; Inject 34 Units under the skin once daily at bedtime. Inject 32 Units under the skin once daily at bedtime.            T2 DM , uncontrolled - s/p gastric bypass surgery ~ 11 yrs ago   A1c improved from before and just at goal      he has made sig changes in diet and trying to eat better , lost ~ 10 lbs.   Basaglar 0-0-0-32  Oxempic 1 mg  mg weekly ( started 4/ 2023 )- off 5 weeks due to anticipated surgery- mild constipation- feels full a lot  metformin 1000 BID    Jardiance 10 mg ( restarted July 2023 - no more UTI after that )      JARDIANCE WAS DISCONTINUED IN JUNE 2023- given yeast infection and then UTI but restarted again in July 2023 after a CHF exacerbation      BG - checks once a day , morning -140s     Lost 30 lbs on ozempic. Occ diarrhea/ constipation . Feels full a lot.       A1c above goal    He was doing food journal before , not doing anymore    Eating more during and after holidays.      PLAN:   Change basaglar to 34 units   After diet changes start checking BG 2 hrs after dinner as well if they are > 180 consistently advised  will add 2.5 mg glipizide before dinner w, will hold off at this time   - no other change in regimen  - will hold off increasing ozempic more , will be carefull given h/o gastric bypass- RYB  - discussed pancreatitis/ cholecystitis risk.     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    Does swimming    2 dogs Merlin ( older) and Zues-   both are small dogs  -  resistance exercises , - water aerobics     Virtual or Telephone Consent    An interactive audio and video telecommunication system which permits real time communications between the patient (at the originating site) and provider (at the distant site) was utilized to provide this telehealth service.   Verbal consent was requested and obtained from Allen Sierra on this date, 02/07/25 for a telehealth visit.

## 2025-02-14 ENCOUNTER — APPOINTMENT (OUTPATIENT)
Facility: CLINIC | Age: 65
End: 2025-02-14
Payer: COMMERCIAL

## 2025-02-14 VITALS
HEIGHT: 71 IN | TEMPERATURE: 97.1 F | OXYGEN SATURATION: 98 % | SYSTOLIC BLOOD PRESSURE: 122 MMHG | RESPIRATION RATE: 20 BRPM | BODY MASS INDEX: 42 KG/M2 | HEART RATE: 77 BPM | DIASTOLIC BLOOD PRESSURE: 72 MMHG | WEIGHT: 300 LBS

## 2025-02-14 DIAGNOSIS — E66.01 OBESITY, CLASS III, BMI 40-49.9 (MORBID OBESITY) (MULTI): ICD-10-CM

## 2025-02-14 DIAGNOSIS — E78.5 HYPERLIPIDEMIA, UNSPECIFIED HYPERLIPIDEMIA TYPE: ICD-10-CM

## 2025-02-14 DIAGNOSIS — E11.42 TYPE 2 DIABETES MELLITUS WITH DIABETIC POLYNEUROPATHY, WITH LONG-TERM CURRENT USE OF INSULIN: ICD-10-CM

## 2025-02-14 DIAGNOSIS — Z00.00 HEALTHCARE MAINTENANCE: Primary | ICD-10-CM

## 2025-02-14 DIAGNOSIS — I42.8 NICM (NONISCHEMIC CARDIOMYOPATHY) (MULTI): ICD-10-CM

## 2025-02-14 DIAGNOSIS — I50.9 CONGESTIVE HEART FAILURE, UNSPECIFIED HF CHRONICITY, UNSPECIFIED HEART FAILURE TYPE: ICD-10-CM

## 2025-02-14 DIAGNOSIS — G47.33 OSA ON CPAP: ICD-10-CM

## 2025-02-14 DIAGNOSIS — K21.9 GASTROESOPHAGEAL REFLUX DISEASE, UNSPECIFIED WHETHER ESOPHAGITIS PRESENT: ICD-10-CM

## 2025-02-14 DIAGNOSIS — I10 HYPERTENSION, ESSENTIAL, BENIGN: ICD-10-CM

## 2025-02-14 DIAGNOSIS — I48.91 ATRIAL FIBRILLATION, UNSPECIFIED TYPE (MULTI): ICD-10-CM

## 2025-02-14 DIAGNOSIS — F32.A ANXIETY AND DEPRESSION: ICD-10-CM

## 2025-02-14 DIAGNOSIS — Z79.4 TYPE 2 DIABETES MELLITUS WITH DIABETIC POLYNEUROPATHY, WITH LONG-TERM CURRENT USE OF INSULIN: ICD-10-CM

## 2025-02-14 DIAGNOSIS — F41.9 ANXIETY AND DEPRESSION: ICD-10-CM

## 2025-02-14 DIAGNOSIS — D50.9 IRON DEFICIENCY ANEMIA, UNSPECIFIED IRON DEFICIENCY ANEMIA TYPE: ICD-10-CM

## 2025-02-14 PROBLEM — E61.1 IRON DEFICIENCY: Status: RESOLVED | Noted: 2023-04-18 | Resolved: 2025-02-14

## 2025-02-14 PROBLEM — D64.9 ANEMIA: Status: RESOLVED | Noted: 2023-04-18 | Resolved: 2025-02-14

## 2025-02-14 LAB
POC APPEARANCE, URINE: CLEAR
POC BILIRUBIN, URINE: NEGATIVE
POC BLOOD, URINE: NEGATIVE
POC COLOR, URINE: YELLOW
POC GLUCOSE, URINE: ABNORMAL MG/DL
POC KETONES, URINE: NEGATIVE MG/DL
POC LEUKOCYTES, URINE: NEGATIVE
POC NITRITE,URINE: NEGATIVE
POC PH, URINE: 6.5 PH
POC PROTEIN, URINE: NEGATIVE MG/DL
POC SPECIFIC GRAVITY, URINE: 1.01
POC UROBILINOGEN, URINE: 0.2 EU/DL

## 2025-02-14 PROCEDURE — 3078F DIAST BP <80 MM HG: CPT | Performed by: FAMILY MEDICINE

## 2025-02-14 PROCEDURE — 1036F TOBACCO NON-USER: CPT | Performed by: FAMILY MEDICINE

## 2025-02-14 PROCEDURE — 99396 PREV VISIT EST AGE 40-64: CPT | Performed by: FAMILY MEDICINE

## 2025-02-14 PROCEDURE — 3008F BODY MASS INDEX DOCD: CPT | Performed by: FAMILY MEDICINE

## 2025-02-14 PROCEDURE — 81002 URINALYSIS NONAUTO W/O SCOPE: CPT | Performed by: FAMILY MEDICINE

## 2025-02-14 PROCEDURE — 4010F ACE/ARB THERAPY RXD/TAKEN: CPT | Performed by: FAMILY MEDICINE

## 2025-02-14 PROCEDURE — 3074F SYST BP LT 130 MM HG: CPT | Performed by: FAMILY MEDICINE

## 2025-02-14 ASSESSMENT — PATIENT HEALTH QUESTIONNAIRE - PHQ9
2. FEELING DOWN, DEPRESSED OR HOPELESS: NOT AT ALL
1. LITTLE INTEREST OR PLEASURE IN DOING THINGS: NOT AT ALL
SUM OF ALL RESPONSES TO PHQ9 QUESTIONS 1 AND 2: 0

## 2025-02-14 ASSESSMENT — ENCOUNTER SYMPTOMS
HEADACHES: 0
SHORTNESS OF BREATH: 0

## 2025-02-14 NOTE — PROGRESS NOTES
"Subjective     Allen Sierra is a 64 y.o. male who presents for Annual Exam (Sees Cardiology).    HPI     Pt is here today for annual well exam.     Pt has IDDM, sees  endo, on ozempic, insulin, jardiance 10 mg, metformin .  He denies any UTI symptoms.  His recent A1c was high at 8.5%.       Hx of CHF, atrial fib.  Sees [facility] cardio.  On entresto, torsemide, coumadin.      AV node ablation on 10/2024 for hx of persistent atrial fib with RVR and underlined chronic HFrEF .  His a fib symptoms have reduced greatly.      He had a pacemaker placed on 9/30/24.      Review of Systems   Respiratory:  Negative for shortness of breath.    Cardiovascular:  Negative for chest pain.   Neurological:  Negative for headaches.       Objective     Vitals:    02/14/25 0948   BP: 122/72   BP Location: Right arm   Patient Position: Sitting   Pulse: 77   Resp: 20   Temp: 36.2 °C (97.1 °F)   TempSrc: Temporal   SpO2: 98%   Weight: 136 kg (300 lb)   Height: 1.803 m (5' 11\")        Current Outpatient Medications   Medication Instructions   • ascorbic acid (VITAMIN C) 500 mg, Daily   • Basaglar KwikPen U-100 Insulin 34 Units, subcutaneous, Nightly   • calcium carbonate-vitamin D3 600 mg-5 mcg (200 unit) tablet 1 tablet, Daily   • coenzyme Q-10 200 mg, Daily   • empagliflozin (JARDIANCE) 10 mg, Daily with breakfast   • ferrous sulfate (325 mg ferrous sulfate) 325 mg, 2 times daily   • FLUoxetine (PROZAC) 80 mg, Daily   • folic acid (FOLVITE) 1 mg, Daily   • Klor-Con M20 20 mEq ER tablet 2 tablets, Every 12 hours scheduled (0630,1830)   • lisinopril 20 mg, oral, Daily   • metFORMIN XR (GLUCOPHAGE-XR) 1,000 mg, oral, 2 times daily (morning and late afternoon)   • multivitamin-children's (Cerovite, Jr) chewable tablet 2 tablets, Daily RT   • Ozempic 1 mg, subcutaneous, Once Weekly   • pantoprazole (PROTONIX) 40 mg, oral, Daily before breakfast   • pen needle, diabetic (BD Johanne 2nd Gen Pen Needle) 32 gauge x 5/32\" needle USE AS DIRECTED DAILY   • " sacubitriL-valsartan (Entresto) 24-26 mg tablet 1 tablet, 2 times daily   • simvastatin (Zocor) 20 mg tablet Take 1 tablet (20 mg) by mouth once daily at bedtime.   • spironolactone (Aldactone) 50 mg tablet 1.5 tablets, Daily (0630)   • torsemide (DEMADEX) 60 mg, 2 times daily   • warfarin (Coumadin) 5 mg tablet Take 1 tablet (5 mg) by mouth Mondays, Wednesdays, and Fridays. Take 0.5 tablet (2.5 mg) by mouth Sundays, Tuesdays, Thursdays, and Saturdays.        No Known Allergies     Past Surgical History:   Procedure Laterality Date   • BARIATRIC SURGERY     • CARDIAC SURGERY      MAZE procedure - 3/2023 -[facility] cardio   • OTHER SURGICAL HISTORY  06/25/2019    Rotator cuff repair   • OTHER SURGICAL HISTORY  06/25/2019    Hernia repair   • OTHER SURGICAL HISTORY  09/27/2021    Colonoscopy   • OTHER SURGICAL HISTORY  09/27/2021    Ablation   • OTHER SURGICAL HISTORY  11/04/2021    Gastric bypass surgery   • ROTATOR CUFF REPAIR          Social History     Tobacco Use   • Smoking status: Never   • Smokeless tobacco: Never   Vaping Use   • Vaping status: Never Used   Substance Use Topics   • Alcohol use: Not Currently   • Drug use: Not Currently        Family History   Problem Relation Name Age of Onset   • Heart failure Mother     • Diabetes Mother     • Hypertension Mother     • Heart failure Father     • Heart disease Brother     • Diabetes Brother     • Hypertension Brother          Immunization History   Administered Date(s) Administered   • COVID-19, mRNA, LNP-S, PF, 30 mcg/0.3 mL dose 02/04/2021, 02/26/2021, 10/18/2021   • Flu vaccine (IIV4), preservative free *Check age/dose* 10/13/2018, 10/19/2019, 09/24/2020, 09/27/2021, 10/11/2022, 09/29/2023   • Flu vaccine, trivalent, preservative free, age 6 months and greater (Fluarix/Fluzone/Flulaval) 10/12/2015   • Flu vaccine, trivalent, preservative free, no egg protein, age 6 months or greater (Flucelvax) 09/21/2024   • Influenza, Unspecified 10/11/2022   • Influenza,  seasonal, injectable 11/14/2007, 10/08/2014   • Pfizer COVID-19 vaccine, 12 years and older, (30mcg/0.3mL) (Comirnaty) 10/17/2023, 09/21/2024   • Pfizer COVID-19 vaccine, bivalent, age 12 years and older (30 mcg/0.3 mL) 10/11/2022   • Pfizer Gray Cap SARS-CoV-2 05/15/2022   • Pneumococcal conjugate vaccine, 20-valent (PREVNAR 20) 11/08/2024   • Pneumococcal polysaccharide vaccine, 23-valent, age 2 years and older (PNEUMOVAX 23) 12/17/2010   • RSV, 60 Years And Older (AREXVY) 01/08/2024   • Tdap vaccine, age 7 year and older (BOOSTRIX, ADACEL) 08/29/2014, 01/01/2015, 07/31/2023   • Zoster vaccine, recombinant, adult (SHINGRIX) 08/31/2018, 01/25/2019   • Zoster, live 12/13/2015        Physical Exam  Vitals reviewed.   Constitutional:       General: He is not in acute distress.     Appearance: Normal appearance. He is obese. He is not ill-appearing.   HENT:      Head: Normocephalic and atraumatic.      Right Ear: Tympanic membrane, ear canal and external ear normal.      Left Ear: Tympanic membrane, ear canal and external ear normal.      Mouth/Throat:      Mouth: Mucous membranes are moist.      Pharynx: No oropharyngeal exudate or posterior oropharyngeal erythema.   Neck:      Thyroid: No thyroid mass or thyromegaly.   Cardiovascular:      Rate and Rhythm: Normal rate and regular rhythm.      Heart sounds: No murmur heard.  Pulmonary:      Effort: No respiratory distress.      Breath sounds: Normal breath sounds. No wheezing, rhonchi or rales.   Abdominal:      General: There is no distension.      Palpations: Abdomen is soft.      Tenderness: There is no abdominal tenderness.   Lymphadenopathy:      Cervical: No cervical adenopathy.   Skin:     General: Skin is warm and dry.      Findings: No rash.   Neurological:      Mental Status: He is alert and oriented to person, place, and time. Mental status is at baseline.   Psychiatric:         Mood and Affect: Mood normal.         Behavior: Behavior normal.        Assessment & Plan  Healthcare maintenance  Well Exam     Colon screening - Colonoscopy is up to date , due 2026.     Vaccines - utd with tdap, flu, covid, rsv, prevnar 20 , shingrix    I recommend yearly flu vaccine and routine COVID vaccinations as indicated     Complete labs    Healthy diet, routine exercise was discussed with patient         Orders:  •  POCT UA (nonautomated) manually resulted    Atrial fibrillation, unspecified type (Multi)  On coumadin, sees [facility] cardio       Congestive heart failure, unspecified HF chronicity, unspecified heart failure type  Sees [facility] cardio        Hyperlipidemia, unspecified hyperlipidemia type  On simvastatin       Hypertension, essential, benign  controlled       NICM (nonischemic cardiomyopathy) (Multi)         Obesity, Class III, BMI 40-49.9 (morbid obesity) (Multi)         RENÉ on CPAP         Anxiety and depression         Iron deficiency anemia, unspecified iron deficiency anemia type    Orders:  •  CBC and Auto Differential; Future  •  Iron and TIBC; Future    Gastroesophageal reflux disease, unspecified whether esophagitis present         Type 2 diabetes mellitus with diabetic polyneuropathy, with long-term current use of insulin

## 2025-02-22 LAB
BASOPHILS # BLD AUTO: 39 CELLS/UL (ref 0–200)
BASOPHILS NFR BLD AUTO: 0.8 %
CHOLEST SERPL-MCNC: 128 MG/DL
CHOLEST/HDLC SERPL: 2.2 (CALC)
EOSINOPHIL # BLD AUTO: 167 CELLS/UL (ref 15–500)
EOSINOPHIL NFR BLD AUTO: 3.4 %
ERYTHROCYTE [DISTWIDTH] IN BLOOD BY AUTOMATED COUNT: 17.1 % (ref 11–15)
HCT VFR BLD AUTO: 41.9 % (ref 38.5–50)
HDLC SERPL-MCNC: 59 MG/DL
HGB BLD-MCNC: 13.3 G/DL (ref 13.2–17.1)
IRON SATN MFR SERPL: 7 % (CALC) (ref 20–48)
IRON SERPL-MCNC: 27 MCG/DL (ref 50–180)
LDLC SERPL CALC-MCNC: 54 MG/DL (CALC)
LYMPHOCYTES # BLD AUTO: 1460 CELLS/UL (ref 850–3900)
LYMPHOCYTES NFR BLD AUTO: 29.8 %
MCH RBC QN AUTO: 25.8 PG (ref 27–33)
MCHC RBC AUTO-ENTMCNC: 31.7 G/DL (ref 32–36)
MCV RBC AUTO: 81.2 FL (ref 80–100)
MONOCYTES # BLD AUTO: 436 CELLS/UL (ref 200–950)
MONOCYTES NFR BLD AUTO: 8.9 %
NEUTROPHILS # BLD AUTO: 2798 CELLS/UL (ref 1500–7800)
NEUTROPHILS NFR BLD AUTO: 57.1 %
NONHDLC SERPL-MCNC: 69 MG/DL (CALC)
PLATELET # BLD AUTO: 271 THOUSAND/UL (ref 140–400)
PMV BLD REES-ECKER: 9.1 FL (ref 7.5–12.5)
RBC # BLD AUTO: 5.16 MILLION/UL (ref 4.2–5.8)
TIBC SERPL-MCNC: 404 MCG/DL (CALC) (ref 250–425)
TRIGL SERPL-MCNC: 72 MG/DL
WBC # BLD AUTO: 4.9 THOUSAND/UL (ref 3.8–10.8)

## 2025-02-24 ENCOUNTER — TELEPHONE (OUTPATIENT)
Dept: ENDOCRINOLOGY | Facility: CLINIC | Age: 65
End: 2025-02-24
Payer: COMMERCIAL

## 2025-02-24 NOTE — TELEPHONE ENCOUNTER
Allen called 2-24-25.  He spoke with you on 2-7-25.  You advised him to call with readings.  He said he had 5 days consistently over 200.  He had a few 190's but mostly higher.  You were considering putting him on Glipizide.  Please advise.  Thanks

## 2025-02-25 DIAGNOSIS — E11.42 TYPE 2 DIABETES MELLITUS WITH DIABETIC POLYNEUROPATHY, WITH LONG-TERM CURRENT USE OF INSULIN: Primary | ICD-10-CM

## 2025-02-25 DIAGNOSIS — Z79.4 TYPE 2 DIABETES MELLITUS WITH DIABETIC POLYNEUROPATHY, WITH LONG-TERM CURRENT USE OF INSULIN: Primary | ICD-10-CM

## 2025-02-25 RX ORDER — GLIPIZIDE 5 MG/1
2.5 TABLET ORAL
Qty: 45 TABLET | Refills: 3 | Status: SHIPPED | OUTPATIENT
Start: 2025-02-25 | End: 2026-02-25

## 2025-02-26 DIAGNOSIS — D50.9 IRON DEFICIENCY ANEMIA, UNSPECIFIED IRON DEFICIENCY ANEMIA TYPE: Primary | ICD-10-CM

## 2025-03-03 DIAGNOSIS — E11.42 TYPE 2 DIABETES MELLITUS WITH DIABETIC POLYNEUROPATHY, WITH LONG-TERM CURRENT USE OF INSULIN: ICD-10-CM

## 2025-03-03 DIAGNOSIS — Z79.4 TYPE 2 DIABETES MELLITUS WITH DIABETIC POLYNEUROPATHY, WITH LONG-TERM CURRENT USE OF INSULIN: ICD-10-CM

## 2025-03-03 RX ORDER — SEMAGLUTIDE 1.34 MG/ML
INJECTION, SOLUTION SUBCUTANEOUS
Qty: 9 ML | Refills: 1 | Status: SHIPPED | OUTPATIENT
Start: 2025-03-03

## 2025-03-17 ENCOUNTER — PATIENT MESSAGE (OUTPATIENT)
Facility: CLINIC | Age: 65
End: 2025-03-17
Payer: COMMERCIAL

## 2025-03-17 DIAGNOSIS — Z79.4 TYPE 2 DIABETES MELLITUS WITH DIABETIC POLYNEUROPATHY, WITH LONG-TERM CURRENT USE OF INSULIN: ICD-10-CM

## 2025-03-17 DIAGNOSIS — K21.9 GASTROESOPHAGEAL REFLUX DISEASE, UNSPECIFIED WHETHER ESOPHAGITIS PRESENT: ICD-10-CM

## 2025-03-17 DIAGNOSIS — Z79.4 LONG TERM (CURRENT) USE OF INSULIN (MULTI): ICD-10-CM

## 2025-03-17 DIAGNOSIS — E11.3553 TYPE 2 DIABETES MELLITUS WITH STABLE PROLIFERATIVE DIABETIC RETINOPATHY, BILATERAL: ICD-10-CM

## 2025-03-17 DIAGNOSIS — E11.42 TYPE 2 DIABETES MELLITUS WITH DIABETIC POLYNEUROPATHY, WITH LONG-TERM CURRENT USE OF INSULIN: ICD-10-CM

## 2025-03-17 RX ORDER — METFORMIN HYDROCHLORIDE 500 MG/1
TABLET, EXTENDED RELEASE ORAL
Qty: 360 TABLET | Refills: 1 | Status: SHIPPED | OUTPATIENT
Start: 2025-03-17

## 2025-03-17 RX ORDER — SEMAGLUTIDE 1.34 MG/ML
1 INJECTION, SOLUTION SUBCUTANEOUS
Qty: 9 ML | Refills: 0 | OUTPATIENT
Start: 2025-03-17

## 2025-03-17 RX ORDER — PANTOPRAZOLE SODIUM 40 MG/1
40 TABLET, DELAYED RELEASE ORAL
Qty: 90 TABLET | Refills: 3 | Status: SHIPPED | OUTPATIENT
Start: 2025-03-17

## 2025-03-18 ENCOUNTER — TELEPHONE (OUTPATIENT)
Dept: ENDOCRINOLOGY | Facility: CLINIC | Age: 65
End: 2025-03-18
Payer: COMMERCIAL

## 2025-03-18 DIAGNOSIS — Z79.4 TYPE 2 DIABETES MELLITUS WITH DIABETIC POLYNEUROPATHY, WITH LONG-TERM CURRENT USE OF INSULIN: ICD-10-CM

## 2025-03-18 DIAGNOSIS — E11.42 TYPE 2 DIABETES MELLITUS WITH DIABETIC POLYNEUROPATHY, WITH LONG-TERM CURRENT USE OF INSULIN: ICD-10-CM

## 2025-03-18 RX ORDER — SEMAGLUTIDE 1.34 MG/ML
1 INJECTION, SOLUTION SUBCUTANEOUS
Qty: 9 ML | Refills: 1 | Status: SHIPPED | OUTPATIENT
Start: 2025-03-18

## 2025-03-18 NOTE — TELEPHONE ENCOUNTER
Patient is not using [facility]; pt now needs to go through [facility] [facility]   Please RF ozempic to [facility], advised might not be able to get through mail order- pt would like to try

## 2025-04-07 ENCOUNTER — APPOINTMENT (OUTPATIENT)
Dept: PULMONOLOGY | Facility: CLINIC | Age: 65
End: 2025-04-07
Payer: COMMERCIAL

## 2025-04-10 ENCOUNTER — APPOINTMENT (OUTPATIENT)
Facility: CLINIC | Age: 65
End: 2025-04-10
Payer: COMMERCIAL

## 2025-04-10 VITALS — HEIGHT: 71 IN | WEIGHT: 300 LBS | BODY MASS INDEX: 42 KG/M2

## 2025-04-10 DIAGNOSIS — G47.33 OSA ON CPAP: Primary | ICD-10-CM

## 2025-04-10 PROCEDURE — 99213 OFFICE O/P EST LOW 20 MIN: CPT | Performed by: INTERNAL MEDICINE

## 2025-04-10 PROCEDURE — 3008F BODY MASS INDEX DOCD: CPT | Performed by: INTERNAL MEDICINE

## 2025-04-10 PROCEDURE — 4010F ACE/ARB THERAPY RXD/TAKEN: CPT | Performed by: INTERNAL MEDICINE

## 2025-04-10 ASSESSMENT — PATIENT HEALTH QUESTIONNAIRE - PHQ9
1. LITTLE INTEREST OR PLEASURE IN DOING THINGS: NOT AT ALL
SUM OF ALL RESPONSES TO PHQ9 QUESTIONS 1 AND 2: 0
2. FEELING DOWN, DEPRESSED OR HOPELESS: NOT AT ALL

## 2025-04-10 ASSESSMENT — PAIN SCALES - GENERAL: PAINLEVEL_OUTOF10: 0-NO PAIN

## 2025-04-26 DIAGNOSIS — Z98.84 STATUS POST GASTRIC BYPASS FOR OBESITY: ICD-10-CM

## 2025-04-26 DIAGNOSIS — D50.9 IRON DEFICIENCY ANEMIA, UNSPECIFIED IRON DEFICIENCY ANEMIA TYPE: Primary | ICD-10-CM

## 2025-04-26 LAB
ALBUMIN SERPL-MCNC: 3.9 G/DL (ref 3.6–5.1)
ALBUMIN/CREAT UR: NORMAL
ALBUMIN/GLOB SERPL: 1.6 (CALC) (ref 1–2.5)
ALP SERPL-CCNC: 50 U/L (ref 35–144)
ALT SERPL-CCNC: 19 U/L (ref 9–46)
AST SERPL-CCNC: 19 U/L (ref 10–35)
BILIRUB SERPL-MCNC: 0.9 MG/DL (ref 0.2–1.2)
BUN SERPL-MCNC: 22 MG/DL (ref 7–25)
BUN/CREAT SERPL: NORMAL (CALC) (ref 6–22)
CALCIUM SERPL-MCNC: 8.7 MG/DL (ref 8.6–10.3)
CHLORIDE SERPL-SCNC: 105 MMOL/L (ref 98–110)
CHOLEST SERPL-MCNC: 132 MG/DL
CHOLEST/HDLC SERPL: 2.1 (CALC)
CO2 SERPL-SCNC: 31 MMOL/L (ref 20–32)
CREAT SERPL-MCNC: 0.99 MG/DL (ref 0.7–1.35)
CREAT UR-MCNC: NORMAL MG/DL
EGFRCR SERPLBLD CKD-EPI 2021: 86 ML/MIN/1.73M2
EST. AVERAGE GLUCOSE BLD GHB EST-MCNC: 148 MG/DL
EST. AVERAGE GLUCOSE BLD GHB EST-SCNC: 8.2 MMOL/L
GLOBULIN SER CALC-MCNC: 2.4 G/DL (CALC) (ref 1.9–3.7)
GLUCOSE SERPL-MCNC: 73 MG/DL (ref 65–99)
HBA1C MFR BLD: 6.8 %
HDLC SERPL-MCNC: 62 MG/DL
IRON SATN MFR SERPL: 8 % (CALC) (ref 20–48)
IRON SERPL-MCNC: 33 MCG/DL (ref 50–180)
LDLC SERPL CALC-MCNC: 54 MG/DL (CALC)
MICROALBUMIN UR-MCNC: NORMAL
NONHDLC SERPL-MCNC: 70 MG/DL (CALC)
POTASSIUM SERPL-SCNC: 3.9 MMOL/L (ref 3.5–5.3)
PROT SERPL-MCNC: 6.3 G/DL (ref 6.1–8.1)
SODIUM SERPL-SCNC: 145 MMOL/L (ref 135–146)
TIBC SERPL-MCNC: 392 MCG/DL (CALC) (ref 250–425)
TRIGL SERPL-MCNC: 75 MG/DL

## 2025-04-28 LAB
ALBUMIN SERPL-MCNC: 3.9 G/DL (ref 3.6–5.1)
ALBUMIN/CREAT UR: 13 MG/G CREAT
ALBUMIN/GLOB SERPL: 1.6 (CALC) (ref 1–2.5)
ALP SERPL-CCNC: 50 U/L (ref 35–144)
ALT SERPL-CCNC: 19 U/L (ref 9–46)
AST SERPL-CCNC: 19 U/L (ref 10–35)
BILIRUB SERPL-MCNC: 0.9 MG/DL (ref 0.2–1.2)
BUN SERPL-MCNC: 22 MG/DL (ref 7–25)
BUN/CREAT SERPL: NORMAL (CALC) (ref 6–22)
CALCIUM SERPL-MCNC: 8.7 MG/DL (ref 8.6–10.3)
CHLORIDE SERPL-SCNC: 105 MMOL/L (ref 98–110)
CHOLEST SERPL-MCNC: 132 MG/DL
CHOLEST/HDLC SERPL: 2.1 (CALC)
CO2 SERPL-SCNC: 31 MMOL/L (ref 20–32)
CREAT SERPL-MCNC: 0.99 MG/DL (ref 0.7–1.35)
CREAT UR-MCNC: 107 MG/DL (ref 20–320)
EGFRCR SERPLBLD CKD-EPI 2021: 86 ML/MIN/1.73M2
EST. AVERAGE GLUCOSE BLD GHB EST-MCNC: 148 MG/DL
EST. AVERAGE GLUCOSE BLD GHB EST-SCNC: 8.2 MMOL/L
GLOBULIN SER CALC-MCNC: 2.4 G/DL (CALC) (ref 1.9–3.7)
GLUCOSE SERPL-MCNC: 73 MG/DL (ref 65–99)
HBA1C MFR BLD: 6.8 %
HDLC SERPL-MCNC: 62 MG/DL
LDLC SERPL CALC-MCNC: 54 MG/DL (CALC)
MICROALBUMIN UR-MCNC: 1.4 MG/DL
NONHDLC SERPL-MCNC: 70 MG/DL (CALC)
POTASSIUM SERPL-SCNC: 3.9 MMOL/L (ref 3.5–5.3)
PROT SERPL-MCNC: 6.3 G/DL (ref 6.1–8.1)
SODIUM SERPL-SCNC: 145 MMOL/L (ref 135–146)
TRIGL SERPL-MCNC: 75 MG/DL

## 2025-05-04 NOTE — PROGRESS NOTES
Department of Medicine  Division of Pulmonary, Critical Care, and Sleep Medicine  Follow-Up Visit  [facility] - [address], [address]    Physician HPI:  Mr. Sierra is a 63-year-old male with past medical history significant for morbid obesity status post gastric bypass surgery 7 years ago, severe obstructive sleep apnea on CPAP currently set at 13 cmH2O, hypertension, diabetes mellitus, A. fib status post ablation and hyperlipidemia who presented to the office today to establish care for obstructive sleep apnea.     Allen reports a stable respiratory status since last visit.  He is following up with cardiology regarding paroxysmal atrial fibrillation with RVR.  He reports having COVID-19 infection in December which triggered A-fib/RVR.  He is scheduled for a cardiac PET scan at the end of the month.  He has had an excellent compliance with CPAP.  90-day CPAP data is reviewed today.  Average AHI is 1.6/h.  He denies excessive daytime sleepiness or fatigue.    Follow up 4/10/2025:  Allen has an excellent compliance with CPAP with no acute symptoms today.  CPAP data is reviewed; average AHI is 2 per hour. Allen denies excessive daytime fatigue or sleepiness.         Immunization History   Administered Date(s) Administered    COVID-19, mRNA, LNP-S, PF, 30 mcg/0.3 mL dose 02/04/2021, 02/26/2021, 10/18/2021    Flu vaccine (IIV4), preservative free *Check age/dose* 10/13/2018, 10/19/2019, 09/24/2020, 09/27/2021, 10/11/2022, 09/29/2023    Flu vaccine, trivalent, preservative free, age 6 months and greater (Fluarix/Fluzone/Flulaval) 10/12/2015    Flu vaccine, trivalent, preservative free, no egg protein, age 6 months or greater (Flucelvax) 09/21/2024    Influenza, Unspecified 10/11/2022    Influenza, seasonal, injectable 11/14/2007, 10/08/2014    Pfizer COVID-19 vaccine, 12 years and older, (30mcg/0.3mL) (Comirnaty) 10/17/2023, 09/21/2024    Pfizer COVID-19 vaccine, bivalent, age 12 years and older (30 mcg/0.3 mL) 10/11/2022  "   Pfizer Gray Cap SARS-CoV-2 05/15/2022    Pneumococcal conjugate vaccine, 20-valent (PREVNAR 20) 11/08/2024    Pneumococcal polysaccharide vaccine, 23-valent, age 2 years and older (PNEUMOVAX 23) 12/17/2010    RSV, 60 Years And Older (AREXVY) 01/08/2024    Tdap vaccine, age 7 year and older (BOOSTRIX, ADACEL) 08/29/2014, 01/01/2015, 07/31/2023    Zoster vaccine, recombinant, adult (SHINGRIX) 08/31/2018, 01/25/2019    Zoster, live 12/13/2015       Current Outpatient Medications   Medication Instructions    ascorbic acid (VITAMIN C) 500 mg, Daily    Basaglar KwikPen U-100 Insulin 34 Units, subcutaneous, Nightly    calcium carbonate-vitamin D3 600 mg-5 mcg (200 unit) tablet 1 tablet, Daily    coenzyme Q-10 200 mg, Daily    empagliflozin (JARDIANCE) 10 mg, Daily with breakfast    ferrous sulfate 325 mg, 2 times daily    FLUoxetine (PROZAC) 80 mg, Daily    folic acid (FOLVITE) 1 mg, Daily    glipiZIDE (GLUCOTROL) 2.5 mg, oral, Daily before evening meal, Skip if not eating    Klor-Con M20 20 mEq ER tablet 2 tablets, Every 12 hours scheduled (0630,1830)    lisinopril 20 mg, oral, Daily    metFORMIN  mg 24 hr tablet TAKE 2 TABLETS TWICE A DAY MORNING AND LATE AFTERNOON    multivitamin-children's (Cerovite, Jr) chewable tablet 2 tablets, Daily RT    Ozempic 1 mg, subcutaneous, Every 7 days    pantoprazole (PROTONIX) 40 mg, oral, Daily before breakfast    pen needle, diabetic (BD Johanne 2nd Gen Pen Needle) 32 gauge x 5/32\" needle USE AS DIRECTED DAILY    sacubitriL-valsartan (Entresto) 24-26 mg tablet 1 tablet, 2 times daily    simvastatin (Zocor) 20 mg tablet Take 1 tablet (20 mg) by mouth once daily at bedtime.    spironolactone (Aldactone) 50 mg tablet 1.5 tablets, Daily (0630)    torsemide (DEMADEX) 60 mg, 2 times daily    warfarin (Coumadin) 5 mg tablet Take 1 tablet (5 mg) by mouth Mondays, Wednesdays, and Fridays. Take 0.5 tablet (2.5 mg) by mouth Sundays, Tuesdays, Thursdays, and Saturdays.        Allergies:  No " "Known Allergies       Visit Vitals  Ht 1.803 m (5' 11\")   Wt 136 kg (300 lb)   BMI 41.84 kg/m²   Smoking Status Never   BSA 2.61 m²               Pulmonary Function Test Results     Pulmonary Functions Testing Results:    No results found for: \"FEV1\", \"FVC\", \"CJZ6COP\", \"TLC\", \"DLCO\"      Chest Radiograph     No results found for this or any previous visit from the past 2000 days.      Echocardiogram     No results found for this or any previous visit from the past 365 days.       Chest CT Scan     No results found for this or any previous visit from the past 365 days.       Laboratory Studies     Lab Results   Component Value Date    WBC 4.9 02/21/2025    HGB 13.3 02/21/2025    HCT 41.9 02/21/2025    MCV 81.2 02/21/2025     02/21/2025      Lab Results   Component Value Date    GLUCOSE 73 04/25/2025    CALCIUM 8.7 04/25/2025     04/25/2025    K 3.9 04/25/2025    CO2 31 04/25/2025     04/25/2025    BUN 22 04/25/2025    CREATININE 0.99 04/25/2025      Lab Results   Component Value Date    ALT 19 04/25/2025    AST 19 04/25/2025    ALKPHOS 50 04/25/2025    BILITOT 0.9 04/25/2025        Protime   Date/Time Value Ref Range Status   01/30/2024 02:32 PM 27.0 (H) 9.8 - 12.8 seconds Final     INR   Date/Time Value Ref Range Status   01/30/2024 02:32 PM 2.4 (H) 0.9 - 1.1 Final       No results found for: \"ICIGE\", \"IGE\", \"ICA04\", \"ASPFU\", \"IGG\", \"IGA\", \"IGM\"          Assessment and Plan / Recommendations     1. Morbid obesity s/p Gastric bypass surgery  2. Severe RENÉ on CPAP  3. HTN, DM and Hyperlipidemia  4. Hx of Afib s/p ablation      Recommend:  Continue on CPAP on current settings via nasal pillows.  F/U in 12 months        Fátima Fuchs MD   04/10/2025  "

## 2025-05-06 ENCOUNTER — APPOINTMENT (OUTPATIENT)
Dept: HEMATOLOGY/ONCOLOGY | Facility: CLINIC | Age: 65
End: 2025-05-06
Payer: COMMERCIAL

## 2025-05-13 DIAGNOSIS — Z79.4 TYPE 2 DIABETES MELLITUS WITH DIABETIC POLYNEUROPATHY, WITH LONG-TERM CURRENT USE OF INSULIN: ICD-10-CM

## 2025-05-13 DIAGNOSIS — E11.42 TYPE 2 DIABETES MELLITUS WITH DIABETIC POLYNEUROPATHY, WITH LONG-TERM CURRENT USE OF INSULIN: ICD-10-CM

## 2025-07-02 ENCOUNTER — LAB (OUTPATIENT)
Dept: LAB | Facility: CLINIC | Age: 65
End: 2025-07-02
Payer: COMMERCIAL

## 2025-07-02 ENCOUNTER — OFFICE VISIT (OUTPATIENT)
Dept: HEMATOLOGY/ONCOLOGY | Facility: CLINIC | Age: 65
End: 2025-07-02
Payer: COMMERCIAL

## 2025-07-02 VITALS
DIASTOLIC BLOOD PRESSURE: 58 MMHG | BODY MASS INDEX: 43.31 KG/M2 | WEIGHT: 310.52 LBS | RESPIRATION RATE: 18 BRPM | SYSTOLIC BLOOD PRESSURE: 104 MMHG | OXYGEN SATURATION: 96 % | TEMPERATURE: 97.5 F | HEART RATE: 60 BPM

## 2025-07-02 DIAGNOSIS — D50.9 IRON DEFICIENCY ANEMIA, UNSPECIFIED IRON DEFICIENCY ANEMIA TYPE: ICD-10-CM

## 2025-07-02 DIAGNOSIS — D64.9 ANEMIA, UNSPECIFIED TYPE: ICD-10-CM

## 2025-07-02 DIAGNOSIS — Z98.84 STATUS POST GASTRIC BYPASS FOR OBESITY: ICD-10-CM

## 2025-07-02 DIAGNOSIS — D64.9 ANEMIA, UNSPECIFIED TYPE: Primary | ICD-10-CM

## 2025-07-02 LAB
ALBUMIN SERPL BCP-MCNC: 4.1 G/DL (ref 3.4–5)
ALP SERPL-CCNC: 48 U/L (ref 33–136)
ALT SERPL W P-5'-P-CCNC: 16 U/L (ref 10–52)
ANION GAP SERPL CALC-SCNC: 13 MMOL/L (ref 10–20)
AST SERPL W P-5'-P-CCNC: 17 U/L (ref 9–39)
BASOPHILS # BLD AUTO: 0.06 X10*3/UL (ref 0–0.1)
BASOPHILS NFR BLD AUTO: 0.9 %
BILIRUB SERPL-MCNC: 0.7 MG/DL (ref 0–1.2)
BUN SERPL-MCNC: 22 MG/DL (ref 6–23)
CALCIUM SERPL-MCNC: 9.2 MG/DL (ref 8.6–10.3)
CHLORIDE SERPL-SCNC: 101 MMOL/L (ref 98–107)
CO2 SERPL-SCNC: 30 MMOL/L (ref 21–32)
CREAT SERPL-MCNC: 0.98 MG/DL (ref 0.5–1.3)
CRP SERPL-MCNC: 0.35 MG/DL
EGFRCR SERPLBLD CKD-EPI 2021: 87 ML/MIN/1.73M*2
EOSINOPHIL # BLD AUTO: 0.15 X10*3/UL (ref 0–0.7)
EOSINOPHIL NFR BLD AUTO: 2.2 %
ERYTHROCYTE [DISTWIDTH] IN BLOOD BY AUTOMATED COUNT: 18.2 % (ref 11.5–14.5)
ERYTHROCYTE [SEDIMENTATION RATE] IN BLOOD BY WESTERGREN METHOD: 10 MM/H (ref 0–20)
FERRITIN SERPL-MCNC: 15 NG/ML (ref 20–300)
GLUCOSE SERPL-MCNC: 106 MG/DL (ref 74–99)
HCT VFR BLD AUTO: 40 % (ref 41–52)
HGB BLD-MCNC: 12.4 G/DL (ref 13.5–17.5)
HGB RETIC QN: 29 PG (ref 28–38)
IMM GRANULOCYTES # BLD AUTO: 0.01 X10*3/UL (ref 0–0.7)
IMM GRANULOCYTES NFR BLD AUTO: 0.1 % (ref 0–0.9)
IMMATURE RETIC FRACTION: 19 %
IRON SATN MFR SERPL: 5 % (ref 25–45)
IRON SERPL-MCNC: 21 UG/DL (ref 35–150)
LDH SERPL L TO P-CCNC: 156 U/L (ref 84–246)
LYMPHOCYTES # BLD AUTO: 1.36 X10*3/UL (ref 1.2–4.8)
LYMPHOCYTES NFR BLD AUTO: 20.2 %
MCH RBC QN AUTO: 25.7 PG (ref 26–34)
MCHC RBC AUTO-ENTMCNC: 31 G/DL (ref 32–36)
MCV RBC AUTO: 83 FL (ref 80–100)
MONOCYTES # BLD AUTO: 0.45 X10*3/UL (ref 0.1–1)
MONOCYTES NFR BLD AUTO: 6.7 %
NEUTROPHILS # BLD AUTO: 4.69 X10*3/UL (ref 1.2–7.7)
NEUTROPHILS NFR BLD AUTO: 69.9 %
PLATELET # BLD AUTO: 255 X10*3/UL (ref 150–450)
POTASSIUM SERPL-SCNC: 3.6 MMOL/L (ref 3.5–5.3)
PROT SERPL-MCNC: 6.9 G/DL (ref 6.4–8.2)
RBC # BLD AUTO: 4.83 X10*6/UL (ref 4.5–5.9)
RETICS #: 0.07 X10*6/UL (ref 0.02–0.12)
RETICS/RBC NFR AUTO: 1.4 % (ref 0.5–2)
SODIUM SERPL-SCNC: 140 MMOL/L (ref 136–145)
TIBC SERPL-MCNC: 421 UG/DL (ref 240–445)
TSH SERPL-ACNC: 0.77 MIU/L (ref 0.44–3.98)
UIBC SERPL-MCNC: 400 UG/DL (ref 110–370)
WBC # BLD AUTO: 6.7 X10*3/UL (ref 4.4–11.3)

## 2025-07-02 PROCEDURE — 86431 RHEUMATOID FACTOR QUANT: CPT

## 2025-07-02 PROCEDURE — 84075 ASSAY ALKALINE PHOSPHATASE: CPT

## 2025-07-02 PROCEDURE — 3078F DIAST BP <80 MM HG: CPT

## 2025-07-02 PROCEDURE — 85025 COMPLETE CBC W/AUTO DIFF WBC: CPT

## 2025-07-02 PROCEDURE — 86140 C-REACTIVE PROTEIN: CPT

## 2025-07-02 PROCEDURE — 82746 ASSAY OF FOLIC ACID SERUM: CPT

## 2025-07-02 PROCEDURE — 84443 ASSAY THYROID STIM HORMONE: CPT

## 2025-07-02 PROCEDURE — 85045 AUTOMATED RETICULOCYTE COUNT: CPT

## 2025-07-02 PROCEDURE — 99214 OFFICE O/P EST MOD 30 MIN: CPT | Mod: 25

## 2025-07-02 PROCEDURE — 84155 ASSAY OF PROTEIN SERUM: CPT

## 2025-07-02 PROCEDURE — 82728 ASSAY OF FERRITIN: CPT

## 2025-07-02 PROCEDURE — 86038 ANTINUCLEAR ANTIBODIES: CPT

## 2025-07-02 PROCEDURE — 83010 ASSAY OF HAPTOGLOBIN QUANT: CPT

## 2025-07-02 PROCEDURE — 83550 IRON BINDING TEST: CPT

## 2025-07-02 PROCEDURE — 4010F ACE/ARB THERAPY RXD/TAKEN: CPT

## 2025-07-02 PROCEDURE — 83521 IG LIGHT CHAINS FREE EACH: CPT

## 2025-07-02 PROCEDURE — 84165 PROTEIN E-PHORESIS SERUM: CPT

## 2025-07-02 PROCEDURE — 99204 OFFICE O/P NEW MOD 45 MIN: CPT

## 2025-07-02 PROCEDURE — 36415 COLL VENOUS BLD VENIPUNCTURE: CPT

## 2025-07-02 PROCEDURE — 82607 VITAMIN B-12: CPT

## 2025-07-02 PROCEDURE — 85652 RBC SED RATE AUTOMATED: CPT

## 2025-07-02 PROCEDURE — 3074F SYST BP LT 130 MM HG: CPT

## 2025-07-02 PROCEDURE — 83615 LACTATE (LD) (LDH) ENZYME: CPT

## 2025-07-02 PROCEDURE — 82784 ASSAY IGA/IGD/IGG/IGM EACH: CPT

## 2025-07-02 PROCEDURE — 82668 ASSAY OF ERYTHROPOIETIN: CPT

## 2025-07-02 PROCEDURE — 83540 ASSAY OF IRON: CPT

## 2025-07-02 ASSESSMENT — PAIN SCALES - GENERAL: PAINLEVEL_OUTOF10: 0-NO PAIN

## 2025-07-02 ASSESSMENT — ENCOUNTER SYMPTOMS
DEPRESSION: 0
OCCASIONAL FEELINGS OF UNSTEADINESS: 0
LOSS OF SENSATION IN FEET: 1

## 2025-07-02 NOTE — PROGRESS NOTES
Patient ID: Allen Sierra is a 64 y.o. male.  Referring Physician: Umesh Finn DO  [address]  [address]  [place] [address]  Primary Care Provider: Umesh Finn DO  Visit Type: Initial Visit    Location: [facility]  Diagnosis/Reason: Iron deficiency anemia     HPI:  Allen Sierra (Enrico) is a 64 y.o. male referred for consultation of iron deficiency anemia. PMH gastric bypass surgery 2015, obstructive sleep apnea on CPAP x25 years, hypertension, T2DM, A-fib status post ablation, hyperlipidemia, CHF, pacemaker, anxiety/depression, GERD, stomach ulcer.     Per review of records:  Follows with pulmonology, endocrinology, cardiology  Takes protonix daily 8 years at least   Has been on Ozempic - 2.5 years approximately   Taking ferrous sulfate once a day since gastric bypass   GI - Jimmie Worrell at [facility] [place], [phone] (previously followed with Dr. Garcia)  Pt says he had IV iron in the past   Reports hx of stomach ulcer after gastric bypass surgery   His GI at the time said it may never heal due to location/having bypass surgery    Lab review:   - Labs done 4/25/25 show unremarkable renal/hepatic function. Iron saturation 8%, iron 33, hgbA1C 6.8%  - Labs 2/21/25: iron saturation 7%, iron 27, hgb 13.3, WBC 4.9, plt 271,000  - Labs 1/30/24: iron saturation 16%, iron 58, ferritin 26, vitamin B12 1,920, hgb 14.0   - Labs 7/31/23: WBC 5.7, hgb 12.1, plt 238,000  - Labs 5/6/22: iron saturation 14%, iron 49, hgb 14.2  - Labs 11/1/21: iron saturation 7%, iron 30, ferritin 18, folate >23.3, hgb 12.7, MCV 86, plt 265,000    Previous Hematological Background:  Hx of hematological disorders: No - Patient denies prior hematologic history  Hx of blood transfusions: No - Patient denies prior blood transfusion  Hx of iron supplementation: Yes - IV and Oral supplementation - Denies adverse effect and reaction - Agents: Tolerated both well.   Hx of B12 supplementation: Yes -  Prior oral supplementation - Denies adverse effect and reaction.   Hx of folate supplementation: Yes - Prior oral supplementation - Denies adverse effect and reaction    Relevant medications:  Warfarin   Currently taking any supplements: Vitamin D, MV, Vitamin C, folic acid, calcium     Patient presents for consultation of iron deficiency.  He has a history of gastric bypass surgery in approximately 2015 and reports he was diagnosed with a stomach ulcer about 2 years later.  He reports being on oral iron supplementation since the gastric bypass surgery daily and is also needed IV iron several years ago which she tolerated well.  He is unaware of having any anemia history in addition to the iron deficiency.    Patient reports that his energy goes up and down which is normal for him, he continues to do work out in his yard.  He likes to exercise by walking his dogs and swimming regularly.  He says his appetite waxes and wanes due to the Ozempic.  He states that he had diarrhea from the Ozempic but he can help manage through his diet, says red meat is a trigger.  He says that he has very dark stool once a week from time to time, unsure if related to the iron he says.    Patient denies nausea, vomiting, persistent constipation or diarrhea, chest pain, heart palpitations, shortness of breath, cough, fevers or chills, recent or recurrent infections, drenching night sweats, lymphadenopathy, edema, pain, hematochezia, hematuria, epistaxis, hemoptysis, hematic emesis, persistent rashes, unusual abdominal pain or bloating, or other concerns.      PMHx:  Active Ambulatory Problems     Diagnosis Date Noted    Anxiety and depression 04/18/2023    Atrial fibrillation (Multi) 04/18/2023    CHF (congestive heart failure) 04/18/2023    T2DM (type 2 diabetes mellitus) (Multi) 04/18/2023    Elevated parathyroid hormone 04/18/2023    GERD (gastroesophageal reflux disease) 04/18/2023    HLD (hyperlipidemia) 04/18/2023    Hypertension,  essential, benign 04/18/2023    Hypokalemia 04/18/2023    Morbid obesity (Multi) 04/18/2023    RENÉ on CPAP 04/18/2023    Status post gastric bypass for obesity 04/18/2023    Undescended testicle 04/18/2023    Vitamin D deficiency 04/18/2023    Abdominal pannus 11/29/2012    Anxiety 03/13/2023    Dyslipidemia 09/27/2023    Dysuria 09/27/2023    Erectile dysfunction 12/19/2016    Gout 09/27/2023    Hypernatremia 09/27/2023    Increased prostate specific antigen (PSA) velocity 09/27/2023    Iron deficiency anemia 11/18/2021    NICM (nonischemic cardiomyopathy) (Multi) 04/23/2013    Obesity, Class III, BMI 40-49.9 (morbid obesity) 11/03/2020    Recurrent major depressive disorder, in remission 09/27/2023    Venous insufficiency 10/15/2010    Abdominal hernia without obstruction and without gangrene 01/09/2024    Type 2 diabetes mellitus with diabetic polyneuropathy, with long-term current use of insulin 02/14/2025     Resolved Ambulatory Problems     Diagnosis Date Noted    Anemia 04/18/2023    Iron deficiency 04/18/2023    Mass of soft tissue of neck 04/18/2023    UTI symptoms 06/15/2023    Hypertension 10/15/2010     Past Medical History:   Diagnosis Date    Cellulitis of right lower limb 03/11/2021    Coronary artery disease     Depression     Diabetes mellitus (Multi)     Sleep apnea     Unspecified injury of unspecified foot, initial encounter 03/03/2019      Denies blood clots, MI, stroke    PSHx:  Surgical History[1]   Barry-en-Y   No issues with healing or bleeding      FHx:  Family History[2]   Mother: Afib.   Father: Heart failure.   Siblings:   5 brothers (strong afib and sleep apnea history)  67 y/o : Skin cancer (unknown details)   Children: Adopted 2 sons   Has a grandson    Social Hx:  Allen Sierra    reports that he has never smoked. He has never used smokeless tobacco.  He  reports that he does not currently use alcohol.  He  reports that he does not currently use drugs.  Social History[3]   Living  "Situation: Lives alone, has two dogs   Occupation: Teacher/consultant   Marital Status: Single   Alcohol Use: Rarely (once per year)   Smoking: Never   Recreational Drug Use: Denies   Special Diets: Higher protein, low carb, low sodium. Can tolerate meat again, mostly chicken and fish. Smaller portions since surgery.     Cancer Screenings:  Upper EGD:   11/10/2016 for \"unexplained ITZEL\"   \"The gastrojejunal anastomosis was characterized by ulceration\" \"small amount of blood seen\"  2/24/2021   \"Gastrojejunal anastomosis characterized by healthy appearing mucosa\"   Colonoscopy:   2/24/21: repeat in 5 years   Two polyps removed    Prostate/PSA screenings: 7/31/23: 3.45    Medications and allergies reviewed in EMR.    ROS:  Review of Systems - Oncology   10 point review of systems negative except as state in HPI.    Vitals & Statistics:  Objective   Visit Vitals  /58   Pulse 60   Temp 36.4 °C (97.5 °F)   Resp 18   Wt 141 kg (310 lb 8.3 oz)   SpO2 96%   BMI 43.31 kg/m²   Smoking Status Never   BSA 2.66 m²       Physical Exam:  Physical Exam  Vitals reviewed.   Constitutional:       Appearance: Normal appearance.   HENT:      Head: Normocephalic and atraumatic.      Nose: Nose normal.      Mouth/Throat:      Mouth: Mucous membranes are moist.      Pharynx: Oropharynx is clear.   Eyes:      Extraocular Movements: Extraocular movements intact.      Conjunctiva/sclera: Conjunctivae normal.      Pupils: Pupils are equal, round, and reactive to light.   Cardiovascular:      Rate and Rhythm: Normal rate and regular rhythm.      Pulses: Normal pulses.      Heart sounds: Normal heart sounds.   Pulmonary:      Effort: Pulmonary effort is normal.      Breath sounds: Normal breath sounds.   Abdominal:      General: Bowel sounds are normal.      Palpations: Abdomen is soft.      Comments: Body habitus impedes abdominal exam but no masses or organomegaly palpable during exam     Musculoskeletal:         General: Normal range of " motion.      Cervical back: Normal range of motion.   Skin:     General: Skin is warm and dry.   Neurological:      General: No focal deficit present.      Mental Status: He is alert and oriented to person, place, and time.   Psychiatric:         Mood and Affect: Mood normal.         Behavior: Behavior normal.         Thought Content: Thought content normal.         Judgment: Judgment normal.           Results:  Lab Results   Component Value Date    WBC 4.9 02/21/2025    NEUTROABS 4.29 01/30/2024    IGABSOL 0.01 01/30/2024    LYMPHSABS 1.52 01/30/2024    MONOSABS 0.40 01/30/2024    EOSABS 167 02/21/2025    BASOSABS 39 02/21/2025    RBC 5.16 02/21/2025    MCV 81.2 02/21/2025    MCHC 31.7 (L) 02/21/2025    HGB 13.3 02/21/2025    HCT 41.9 02/21/2025     02/21/2025       Lab Results   Component Value Date    CREATININE 0.99 04/25/2025    BUN 22 04/25/2025    EGFR 86 04/25/2025     04/25/2025    K 3.9 04/25/2025     04/25/2025    CO2 31 04/25/2025      Lab Results   Component Value Date    ALT 19 04/25/2025    AST 19 04/25/2025    ALKPHOS 50 04/25/2025    BILITOT 0.9 04/25/2025      Lab Results   Component Value Date    TSH 1.52 05/06/2022     Lab Results   Component Value Date    TSH 1.52 05/06/2022     Lab Results   Component Value Date    IRON 33 (L) 04/25/2025    TIBC 392 04/25/2025    FERRITIN 26 01/30/2024      Lab Results   Component Value Date    LYEFTVJM84 1,920 (H) 01/30/2024      Lab Results   Component Value Date    FOLATE >23.3 11/01/2021     Imaging:  CT a/p 5/15/24  IMPRESSION:     Focal fatty protrusion in the subxiphoid region consistent with hernia.     No additional ventral wall hernia.     Barry-en-Y gastric bypass.     Assessment/Plan:   Allen Rios) is a 64 y.o. male referred for consultation of iron deficiency anemia. [facility] gastric bypass surgery 2015, obstructive sleep apnea on CPAP x25 years, hypertension, T2DM, A-fib status post ablation, hyperlipidemia, CHF, pacemaker,  anxiety/depression, GERD, stomach ulcer.     Iron deficiency   - Labs done 4/25/25 show unremarkable renal/hepatic function. Iron saturation 8%, iron 33, hgbA1C 6.8%  - Labs 2/21/25: iron saturation 7%, iron 27, hgb 13.3, WBC 4.9, plt 271,000  - Labs 1/30/24: iron saturation 16%, iron 58, ferritin 26, vitamin B12 1,920, hgb 14.0   - Labs 7/31/23: WBC 5.7, hgb 12.1, plt 238,000  - Labs 5/6/22: iron saturation 14%, iron 49, hgb 14.2  - Labs 11/1/21: iron saturation 7%, iron 30, ferritin 18, folate >23.3, hgb 12.7, MCV 86, plt 265,000  - Reports iron deficiency several years ago, had IV iron and tolerated well   - Reports he is on oral iron daily since his gastric bypass surgery   - Has been on PPI for several years also   - Most recent labs show iron deficiency but no anemia  - He has hx of ulceration in stomach but most recent EGD showed healthy tissue  - No significant history of GI cancers in his family   - Discussed he does not eat much red meat, has been on PPI for many years, and has hx gastric bypass which can all impact iron absorption/deficiency   - Discussed possible etiologies including multifactorial, nutritional deficiencies, anemia of chronic disease, malabsorption, hemopathy, medications, bleeding, malignancy, etc. Will start hematological workup today.      I reviewed patient's chart including but not limited to labs, imaging, surgical/procedure notes, pathology, hospital notes, doctor's notes.    I had an extensive discussion with the patient regarding the diagnosis and discussed the plan of therapy, including general considerations regarding side effects and outcomes. Pt understood and gave appropriate teach back about the plan of care. All questions were answered to the patient's satisfaction. The patient is instructed to contact us at any time if questions or problems arise. Thank you for the opportunity to participate in the care of this very pleasant patient.    Total time = 45 minutes. 50% or  more of this time was spent in counseling and/or coordination of care including reviewing medical history/radiology/labs, examining patient, formulating outlined plan with team, and discussing plan with patient/family.    Diagnoses and all orders for this visit:  Anemia, unspecified type  -     CANDELARIA with Reflex to YANE; Future  -     CBC and Auto Differential; Future  -     Comprehensive Metabolic Panel; Future  -     C-Reactive Protein; Future  -     Erythropoietin; Future  -     Ferritin; Future  -     Lactate Dehydrogenase; Future  -     Henning/Lambda Free Light Chain, Serum; Future  -     Iron and TIBC; Future  -     Folate; Future  -     Reticulocytes; Future  -     Rheumatoid Factor; Future  -     Sedimentation Rate; Future  -     Serum Protein Electrophoresis; Future  -     TSH with reflex to Free T4 if abnormal; Future  -     Vitamin B12; Future  -     Immunoglobulins (IgG, IgA, IgM); Future  -     Haptoglobin; Future  Iron deficiency anemia, unspecified iron deficiency anemia type  -     Referral To Hematology and Oncology  -     CANDELARIA with Reflex to YANE; Future  -     CBC and Auto Differential; Future  -     Comprehensive Metabolic Panel; Future  -     C-Reactive Protein; Future  -     Erythropoietin; Future  -     Ferritin; Future  -     Lactate Dehydrogenase; Future  -     Henning/Lambda Free Light Chain, Serum; Future  -     Iron and TIBC; Future  -     Folate; Future  -     Reticulocytes; Future  -     Rheumatoid Factor; Future  -     Sedimentation Rate; Future  -     Serum Protein Electrophoresis; Future  -     TSH with reflex to Free T4 if abnormal; Future  -     Vitamin B12; Future  -     Immunoglobulins (IgG, IgA, IgM); Future  -     Haptoglobin; Future  Status post gastric bypass for obesity  -     Referral To Hematology and Oncology  -     CANDELARIA with Reflex to YANE; Future  -     CBC and Auto Differential; Future  -     Comprehensive Metabolic Panel; Future  -     C-Reactive Protein; Future  -      Erythropoietin; Future  -     Ferritin; Future  -     Lactate Dehydrogenase; Future  -     Protection/Lambda Free Light Chain, Serum; Future  -     Iron and TIBC; Future  -     Folate; Future  -     Reticulocytes; Future  -     Rheumatoid Factor; Future  -     Sedimentation Rate; Future  -     Serum Protein Electrophoresis; Future  -     TSH with reflex to Free T4 if abnormal; Future  -     Vitamin B12; Future  -     Immunoglobulins (IgG, IgA, IgM); Future  -     Haptoglobin; Future      CAROLE Parisi-CNP          [1]   Past Surgical History:  Procedure Laterality Date    BARIATRIC SURGERY      CARDIAC SURGERY      MAZE procedure - 3/2023 -[facility] cardio    OTHER SURGICAL HISTORY  06/25/2019    Rotator cuff repair    OTHER SURGICAL HISTORY  06/25/2019    Hernia repair    OTHER SURGICAL HISTORY  09/27/2021    Colonoscopy    OTHER SURGICAL HISTORY  09/27/2021    Ablation    OTHER SURGICAL HISTORY  11/04/2021    Gastric bypass surgery    ROTATOR CUFF REPAIR     [2]   Family History  Problem Relation Name Age of Onset    Heart failure Mother      Diabetes Mother      Hypertension Mother      Heart failure Father      Heart disease Brother      Diabetes Brother      Hypertension Brother     [3]   Social History  Socioeconomic History    Marital status: Single   Tobacco Use    Smoking status: Never    Smokeless tobacco: Never   Vaping Use    Vaping status: Never Used   Substance and Sexual Activity    Alcohol use: Not Currently    Drug use: Not Currently     Social Drivers of Health     Financial Resource Strain: Low Risk  (3/17/2023)    Received from [facility]    Overall Financial Resource Strain (CARDIA)     Difficulty of Paying Living Expenses: Not hard at all   Food Insecurity: No Food Insecurity (3/17/2023)    Received from [facility]    Hunger Vital Sign     Worried About Running Out of Food in the Last Year: Never true     Ran Out of Food in the Last Year: Never true   Transportation Needs: No  Transportation Needs (3/17/2023)    Received from [facility]    PRAPARE - Transportation     Lack of Transportation (Medical): No     Lack of Transportation (Non-Medical): No   Housing Stability: Unknown (3/17/2023)    Received from [facility]    Housing Stability Vital Sign     Unable to Pay for Housing in the Last Year: No     Unstable Housing in the Last Year: No

## 2025-07-03 LAB
ALBUMIN: 3.6 G/DL (ref 3.4–5)
ALPHA 1 GLOBULIN: 0.3 G/DL (ref 0.2–0.6)
ALPHA 2 GLOBULIN: 0.7 G/DL (ref 0.4–1.1)
ANA SER QL HEP2 SUBST: NEGATIVE
BETA GLOBULIN: 0.9 G/DL (ref 0.5–1.2)
FOLATE SERPL-MCNC: >24 NG/ML
GAMMA GLOBULIN: 0.8 G/DL (ref 0.5–1.4)
HAPTOGLOB SERPL NEPH-MCNC: 174 MG/DL (ref 30–200)
IGA SERPL-MCNC: 341 MG/DL (ref 70–400)
IGG SERPL-MCNC: 857 MG/DL (ref 700–1600)
IGM SERPL-MCNC: 33 MG/DL (ref 40–230)
KAPPA LC SERPL-MCNC: 2.86 MG/DL (ref 0.33–1.94)
KAPPA LC/LAMBDA SER: 1.27 {RATIO} (ref 0.26–1.65)
LAMBDA LC SERPL-MCNC: 2.26 MG/DL (ref 0.57–2.63)
PATH REVIEW-SERUM PROTEIN ELECTROPHORESIS: NORMAL
PROT SERPL-MCNC: 6.3 G/DL (ref 6.4–8.2)
PROTEIN ELECTROPHORESIS COMMENT: NORMAL
RHEUMATOID FACT SER NEPH-ACNC: <10 IU/ML (ref 0–15)
VIT B12 SERPL-MCNC: 919 PG/ML (ref 211–911)

## 2025-07-04 LAB — EPO SERPL-ACNC: 56 MU/ML (ref 4–27)

## 2025-07-09 ENCOUNTER — APPOINTMENT (OUTPATIENT)
Dept: HEMATOLOGY/ONCOLOGY | Facility: CLINIC | Age: 65
End: 2025-07-09
Payer: COMMERCIAL

## 2025-07-14 ENCOUNTER — APPOINTMENT (OUTPATIENT)
Dept: HEMATOLOGY/ONCOLOGY | Facility: CLINIC | Age: 65
End: 2025-07-14
Payer: COMMERCIAL

## 2025-07-17 ENCOUNTER — APPOINTMENT (OUTPATIENT)
Dept: ENDOCRINOLOGY | Facility: CLINIC | Age: 65
End: 2025-07-17
Payer: COMMERCIAL

## 2025-07-23 ENCOUNTER — APPOINTMENT (OUTPATIENT)
Dept: HEMATOLOGY/ONCOLOGY | Facility: CLINIC | Age: 65
End: 2025-07-23
Payer: COMMERCIAL

## 2025-08-01 ENCOUNTER — TELEMEDICINE (OUTPATIENT)
Dept: HEMATOLOGY/ONCOLOGY | Facility: CLINIC | Age: 65
End: 2025-08-01
Payer: COMMERCIAL

## 2025-08-01 DIAGNOSIS — D64.9 ANEMIA, UNSPECIFIED TYPE: ICD-10-CM

## 2025-08-01 DIAGNOSIS — Z98.84 STATUS POST GASTRIC BYPASS FOR OBESITY: ICD-10-CM

## 2025-08-01 DIAGNOSIS — D50.8 OTHER IRON DEFICIENCY ANEMIA: Primary | ICD-10-CM

## 2025-08-01 PROCEDURE — 99214 OFFICE O/P EST MOD 30 MIN: CPT

## 2025-08-01 PROCEDURE — 4010F ACE/ARB THERAPY RXD/TAKEN: CPT

## 2025-08-01 RX ORDER — FAMOTIDINE 10 MG/ML
20 INJECTION, SOLUTION INTRAVENOUS ONCE AS NEEDED
OUTPATIENT
Start: 2025-08-08

## 2025-08-01 RX ORDER — ALBUTEROL SULFATE 0.83 MG/ML
3 SOLUTION RESPIRATORY (INHALATION) AS NEEDED
OUTPATIENT
Start: 2025-08-08

## 2025-08-01 RX ORDER — EPINEPHRINE 0.3 MG/.3ML
0.3 INJECTION SUBCUTANEOUS EVERY 5 MIN PRN
OUTPATIENT
Start: 2025-08-08

## 2025-08-01 RX ORDER — HEPARIN SODIUM,PORCINE/PF 10 UNIT/ML
50 SYRINGE (ML) INTRAVENOUS AS NEEDED
OUTPATIENT
Start: 2025-08-01

## 2025-08-01 RX ORDER — DIPHENHYDRAMINE HYDROCHLORIDE 50 MG/ML
50 INJECTION, SOLUTION INTRAMUSCULAR; INTRAVENOUS AS NEEDED
OUTPATIENT
Start: 2025-08-08

## 2025-08-01 RX ORDER — HEPARIN 100 UNIT/ML
500 SYRINGE INTRAVENOUS AS NEEDED
OUTPATIENT
Start: 2025-08-01

## 2025-08-01 NOTE — PROGRESS NOTES
Patient ID: Allen Sierra is a 65 y.o. male.  Referring Physician: No referring provider defined for this encounter.  Primary Care Provider: Umesh Finn DO  Visit Type: Follow up    Location: [facility]  Diagnosis/Reason: Iron deficiency anemia     HPI:  Allen Sierra (Enrico) is a 65 y.o. male referred for consultation of iron deficiency anemia. [facility] gastric bypass surgery 2015, obstructive sleep apnea on CPAP x25 years, hypertension, T2DM, A-fib status post ablation, hyperlipidemia, CHF, pacemaker, anxiety/depression, GERD, stomach ulcer.     Per review of records:  Follows with pulmonology, endocrinology, cardiology  Takes protonix daily 8 years at least   Has been on Ozempic - 2.5 years approximately   Taking ferrous sulfate once a day since gastric bypass   GI - Jimmie Worrell at [facility], [phone] (previously followed with Dr. Garcia)  Pt says he had IV iron in the past   Reports hx of stomach ulcer after gastric bypass surgery   His GI at the time said it may never heal due to location/having bypass surgery    Lab review:   - Labs 4/25/25 show unremarkable renal/hepatic function. Iron saturation 8%, iron 33, hgbA1C 6.8%  - Labs 2/21/25: iron saturation 7%, iron 27, hgb 13.3, WBC 4.9, plt 271,000  - Labs 1/30/24: iron saturation 16%, iron 58, ferritin 26, vitamin B12 1,920, hgb 14.0   - Labs 7/31/23: WBC 5.7, hgb 12.1, plt 238,000  - Labs 5/6/22: iron saturation 14%, iron 49, hgb 14.2  - Labs 11/1/21: iron saturation 7%, iron 30, ferritin 18, folate >23.3, hgb 12.7, MCV 86, plt 265,000    Previous Hematological Background:  Hx of hematological disorders: No - Patient denies prior hematologic history  Hx of blood transfusions: No - Patient denies prior blood transfusion  Hx of iron supplementation: Yes - IV and Oral supplementation - Denies adverse effect and reaction - Agents: Tolerated both well.   Hx of B12 supplementation: Yes - Prior oral supplementation -  Denies adverse effect and reaction.   Hx of folate supplementation: Yes - Prior oral supplementation - Denies adverse effect and reaction    Relevant medications:  Warfarin   Currently taking any supplements: Vitamin D, MV, Vitamin C, folic acid, calcium   Otc 1000 b12     Patient presents for consultation of iron deficiency.  He has a history of gastric bypass surgery in approximately 2015 and reports he was diagnosed with a stomach ulcer about 2 years later.  He reports being on oral iron supplementation since the gastric bypass surgery daily and is also needed IV iron several years ago which he tolerated well.  He is unaware of having any anemia history in addition to the iron deficiency.    Patient reports that his energy goes up and down which is normal for him, he continues to do work out in his yard.  He likes to exercise by walking his dogs and swimming regularly.  He says his appetite waxes and wanes due to the Ozempic.  He states that he had diarrhea from the Ozempic but he can help manage through his diet, says red meat is a trigger.  He says that he has very dark stool once a week from time to time, unsure if related to the iron he says.    8/1/2025:  Patient presents for follow up visit to review anemia workup. He reports he has been doing well, recently was out of town. He says when he travels that he does not eat much and therefore has had recent weight loss but appetite is unchanged. Since last visit patient denies health changes or hospital stays. Continues on warfarin and denies signs of bleeding. Denies recent or recurrent infections. He has had IV iron once before and says he tolerated well, unsure when exactly. Denies restless legs, PICA, insomnia, chest pain, SOB, palpitations,  pain, melena, hematochezia, abdominal pain or bloating, ect.     Remains on PPI and oral iron. Has intermittent constipation/diarrhea from ozempic.     Patient denies nausea, vomiting, persistent constipation or  diarrhea, chest pain, heart palpitations, shortness of breath, cough, fevers or chills, recent or recurrent infections, drenching night sweats, lymphadenopathy, edema, pain, hematochezia, hematuria, epistaxis, hemoptysis, hematic emesis, persistent rashes, unusual abdominal pain or bloating, or other concerns.    PMHx:  Active Ambulatory Problems     Diagnosis Date Noted    Anxiety and depression 04/18/2023    Atrial fibrillation (Multi) 04/18/2023    CHF (congestive heart failure) 04/18/2023    T2DM (type 2 diabetes mellitus) (Multi) 04/18/2023    Elevated parathyroid hormone 04/18/2023    GERD (gastroesophageal reflux disease) 04/18/2023    HLD (hyperlipidemia) 04/18/2023    Hypertension, essential, benign 04/18/2023    Hypokalemia 04/18/2023    Morbid obesity (Multi) 04/18/2023    RENÉ on CPAP 04/18/2023    Status post gastric bypass for obesity 04/18/2023    Undescended testicle 04/18/2023    Vitamin D deficiency 04/18/2023    Abdominal pannus 11/29/2012    Anxiety 03/13/2023    Dyslipidemia 09/27/2023    Dysuria 09/27/2023    Erectile dysfunction 12/19/2016    Gout 09/27/2023    Hypernatremia 09/27/2023    Increased prostate specific antigen (PSA) velocity 09/27/2023    Iron deficiency anemia 11/18/2021    NICM (nonischemic cardiomyopathy) (Multi) 04/23/2013    Obesity, Class III, BMI 40-49.9 (morbid obesity) 11/03/2020    Recurrent major depressive disorder, in remission 09/27/2023    Venous insufficiency 10/15/2010    Abdominal hernia without obstruction and without gangrene 01/09/2024    Type 2 diabetes mellitus with diabetic polyneuropathy, with long-term current use of insulin 02/14/2025     Resolved Ambulatory Problems     Diagnosis Date Noted    Anemia 04/18/2023    Iron deficiency 04/18/2023    Mass of soft tissue of neck 04/18/2023    UTI symptoms 06/15/2023    Hypertension 10/15/2010     Past Medical History:   Diagnosis Date    Cellulitis of right lower limb 03/11/2021    Coronary artery disease      "Depression     Diabetes mellitus (Multi)     Sleep apnea     Unspecified injury of unspecified foot, initial encounter 03/03/2019      Denies blood clots, MI, stroke    PSHx:  Surgical History[1]   Barry-en-Y   No issues with healing or bleeding      FHx:  Family History[2]   Mother: Afib.   Father: Heart failure.   Siblings:   5 brothers (strong afib and sleep apnea history)  67 y/o : Skin cancer (unknown details)   Children: Adopted 2 sons   Has a grandson    Social Hx:  Allen Sierra    reports that he has never smoked. He has never used smokeless tobacco.  He  reports that he does not currently use alcohol.  He  reports that he does not currently use drugs.  Social History[3]   Living Situation: Lives alone, has two dogs   Occupation: Teacher/consultant   Marital Status: Single   Alcohol Use: Rarely (once per year)   Smoking: Never   Recreational Drug Use: Denies   Special Diets: Higher protein, low carb, low sodium. Can tolerate meat again, mostly chicken and fish. Smaller portions since surgery.     Cancer Screenings:  Upper EGD:   11/10/2016 for \"unexplained ITZEL\"   \"The gastrojejunal anastomosis was characterized by ulceration\" \"small amount of blood seen\"  2/24/2021   \"Gastrojejunal anastomosis characterized by healthy appearing mucosa\"   Colonoscopy:   2/24/21: repeat in 5 years   Two polyps removed    Prostate/PSA screenings: 7/31/23: 3.45    Medications and allergies reviewed in EMR.    ROS:  Review of Systems - Oncology   10 point review of systems negative except as state in HPI.    Vitals & Statistics:  Objective   Visit Vitals  Smoking Status Never     Vital signs and physical exam not completed due to virtual visit. On camera patient is alert and oriented and in no acute distress.     Results:  Lab Results   Component Value Date    WBC 6.7 07/02/2025    NEUTROABS 4.69 07/02/2025    IGABSOL 0.01 07/02/2025    LYMPHSABS 1.36 07/02/2025    MONOSABS 0.45 07/02/2025    EOSABS 0.15 07/02/2025    BASOSABS 0.06 " 07/02/2025    RBC 4.83 07/02/2025    MCV 83 07/02/2025    MCHC 31.0 (L) 07/02/2025    HGB 12.4 (L) 07/02/2025    HCT 40.0 (L) 07/02/2025     07/02/2025       Lab Results   Component Value Date    CREATININE 0.98 07/02/2025    BUN 22 07/02/2025    EGFR 87 07/02/2025     07/02/2025    K 3.6 07/02/2025     07/02/2025    CO2 30 07/02/2025      Lab Results   Component Value Date    ALT 16 07/02/2025    AST 17 07/02/2025    ALKPHOS 48 07/02/2025    BILITOT 0.7 07/02/2025      Lab Results   Component Value Date    TSH 0.77 07/02/2025     Lab Results   Component Value Date    TSH 0.77 07/02/2025     Lab Results   Component Value Date    IRON 21 (L) 07/02/2025    TIBC 421 07/02/2025    FERRITIN 15 (L) 07/02/2025      Lab Results   Component Value Date    QYTQCVYG31 919 (H) 07/02/2025      Lab Results   Component Value Date    FOLATE >24.0 07/02/2025     Imaging:  CT a/p 5/15/24  IMPRESSION:     Focal fatty protrusion in the subxiphoid region consistent with hernia.     No additional ventral wall hernia.     Barry-en-Y gastric bypass.     Assessment/Plan:   Allen Rios) is a 64 y.o. male referred for consultation of iron deficiency anemia. [facility] gastric bypass surgery 2015, obstructive sleep apnea on CPAP x25 years, hypertension, T2DM, A-fib status post ablation, hyperlipidemia, CHF, pacemaker, anxiety/depression, GERD, stomach ulcer.     Iron deficiency   - Labs done 4/25/25 show unremarkable renal/hepatic function. Iron saturation 8%, iron 33, hgbA1C 6.8%  - Labs 2/21/25: iron saturation 7%, iron 27, hgb 13.3, WBC 4.9, plt 271,000  - Labs 1/30/24: iron saturation 16%, iron 58, ferritin 26, vitamin B12 1,920, hgb 14.0   - Labs 7/31/23: WBC 5.7, hgb 12.1, plt 238,000  - Labs 5/6/22: iron saturation 14%, iron 49, hgb 14.2  - Labs 11/1/21: iron saturation 7%, iron 30, ferritin 18, folate >23.3, hgb 12.7, MCV 86, plt 265,000  - Reports iron deficiency several years ago, had IV iron and tolerated well   -  Reports he is on oral iron daily since his gastric bypass surgery   - Has been on PPI for several years also   - Most recent labs show iron deficiency but no anemia  - He has hx of ulceration in stomach but most recent EGD showed healthy tissue  - No significant history of GI cancers in his family   - Discussed he does not eat much red meat, has been on PPI for many years, and has hx gastric bypass which can all impact iron absorption/deficiency   - Discussed possible etiologies including multifactorial, nutritional deficiencies, anemia of chronic disease, malabsorption, hemopathy, medications, bleeding, malignancy, etc. Will start hematological workup today.    8/1/2025:  - Patient presents for follow up visit to review anemia workup   - Labs show ITZEL likely 2/2 gastric bypass surgery history   - Labs done on 7/2/26 shows WBC 6.7, hemoglobin 12.4, MCV 83, platelets 255,000, reticulocyte 1.4%, sed rate 10, CANDELARIA negative, rheumatoid factor less than 10, CRP 0.35, IgG 857, IgM 33, IgA 341, kappa free light chain 2.86, lambda free light chain 2.26, free light chain ratio 1.27, SPEP without monoclonal protein, TSH 0.77, vitamin B12 919, EPO 56, , haptoglobin 174, folate >24.0, iron saturation 5%, iron 21, ferritin 15, creatinine 0.98, ALT 16, AST 17  - Labs show iron deficiency  - He is on PPI, which we discussed can contribute to ITZEL because of malabsorption of nutrients  - Remains on iron daily but we discussed he is not having iron improvement   - Follows with Dr. Worrell in GI, due for colonoscopy in Feb. 2026, denies GI changes or symptoms    - We discussed ITZEL from his bypass surgery and that we will go ahead with IV since he has not had improvement with oral iron   - He will stay on oral iron per his preference   - Discussed risks and benefits of IV Venofer 300 mg x3  - He will call in the meantime if he has questions or signs of bleeding/anemia   - He will RTC in 3 months for repeat cbc-d,, iron panel and  ferritin with FUV       I reviewed patient's chart including but not limited to labs, imaging, surgical/procedure notes, pathology, hospital notes, doctor's notes.    I had an extensive discussion with the patient regarding the diagnosis and discussed the plan of therapy, including general considerations regarding side effects and outcomes. Pt understood and gave appropriate teach back about the plan of care. All questions were answered to the patient's satisfaction. The patient is instructed to contact us at any time if questions or problems arise. Thank you for the opportunity to participate in the care of this very pleasant patient.        Diagnoses and all orders for this visit:  Other iron deficiency anemia  -     Clinic Appointment Request GABRIELLE ESTRADA; Future  -     CBC and Auto Differential; Future  -     Ferritin; Future  -     Iron and TIBC; Future  Anemia, unspecified type  -     Clinic Appointment Request GABRIELLE ESTRADA; Future  -     CBC and Auto Differential; Future  -     Ferritin; Future  -     Iron and TIBC; Future  Status post gastric bypass for obesity  -     Clinic Appointment Request GABRIELLE ESTRADA; Future  -     CBC and Auto Differential; Future  -     Ferritin; Future  -     Iron and TIBC; Future  Other orders  -     iron sucrose (Venofer) 300 mg in sodium chloride 0.9% 265 mL IV  -     sodium chloride 0.9 % bolus 500 mL  -     dextrose 5 % in water (D5W) bolus 500 mL  -     diphenhydrAMINE (BENADryl) injection 50 mg  -     methylPREDNISolone sod succinate (SOLU-Medrol) 40 mg/mL injection 40 mg  -     famotidine PF (Pepcid) injection 20 mg  -     EPINEPHrine (Epipen) injection syringe 0.3 mg  -     albuterol 2.5 mg /3 mL (0.083 %) nebulizer solution 3 mL  -     heparin flush 10 unit/mL syringe 50 Units  -     heparin flush 100 unit/mL syringe 500 Units  -     alteplase (Cathflo Activase) injection 2 mg      CAROLE Parisi-CNP            [1]   Past Surgical  History:  Procedure Laterality Date    BARIATRIC SURGERY      CARDIAC SURGERY      MAZE procedure - 3/2023 -[facility] cardio    OTHER SURGICAL HISTORY  06/25/2019    Rotator cuff repair    OTHER SURGICAL HISTORY  06/25/2019    Hernia repair    OTHER SURGICAL HISTORY  09/27/2021    Colonoscopy    OTHER SURGICAL HISTORY  09/27/2021    Ablation    OTHER SURGICAL HISTORY  11/04/2021    Gastric bypass surgery    ROTATOR CUFF REPAIR     [2]   Family History  Problem Relation Name Age of Onset    Heart failure Mother      Diabetes Mother      Hypertension Mother      Heart failure Father      Heart disease Brother      Diabetes Brother      Hypertension Brother     [3]   Social History  Socioeconomic History    Marital status: Single   Tobacco Use    Smoking status: Never    Smokeless tobacco: Never   Vaping Use    Vaping status: Never Used   Substance and Sexual Activity    Alcohol use: Not Currently    Drug use: Not Currently     Social Drivers of Health     Financial Resource Strain: Low Risk  (3/17/2023)    Received from [facility]    Overall Financial Resource Strain (CARDIA)     Difficulty of Paying Living Expenses: Not hard at all   Food Insecurity: No Food Insecurity (3/17/2023)    Received from [facility]    Hunger Vital Sign     Within the past 12 months, you worried that your food would run out before you got the money to buy more.: Never true     Within the past 12 months, the food you bought just didn't last and you didn't have money to get more.: Never true   Transportation Needs: No Transportation Needs (3/17/2023)    Received from [facility]    PRAPARE - Transportation     Lack of Transportation (Medical): No     Lack of Transportation (Non-Medical): No   Housing Stability: Unknown (3/17/2023)    Received from [facility]    Housing Stability Vital Sign     Unable to Pay for Housing in the Last Year: No     Unstable Housing in the Last Year: No

## 2025-08-05 ENCOUNTER — APPOINTMENT (OUTPATIENT)
Dept: ENDOCRINOLOGY | Facility: CLINIC | Age: 65
End: 2025-08-05
Payer: COMMERCIAL

## 2025-08-05 VITALS
WEIGHT: 307 LBS | DIASTOLIC BLOOD PRESSURE: 58 MMHG | SYSTOLIC BLOOD PRESSURE: 102 MMHG | BODY MASS INDEX: 42.98 KG/M2 | HEIGHT: 71 IN

## 2025-08-05 DIAGNOSIS — E78.5 DYSLIPIDEMIA: ICD-10-CM

## 2025-08-05 DIAGNOSIS — E11.42 TYPE 2 DIABETES MELLITUS WITH DIABETIC POLYNEUROPATHY, WITH LONG-TERM CURRENT USE OF INSULIN: Primary | ICD-10-CM

## 2025-08-05 DIAGNOSIS — I10 HYPERTENSION, ESSENTIAL, BENIGN: ICD-10-CM

## 2025-08-05 DIAGNOSIS — Z79.4 TYPE 2 DIABETES MELLITUS WITH DIABETIC POLYNEUROPATHY, WITH LONG-TERM CURRENT USE OF INSULIN: Primary | ICD-10-CM

## 2025-08-05 LAB
POC FINGERSTICK BLOOD GLUCOSE: 125 MG/DL (ref 70–100)
POC HEMOGLOBIN A1C: 6.3 % (ref 4.2–6.5)

## 2025-08-05 PROCEDURE — 3078F DIAST BP <80 MM HG: CPT | Performed by: HOSPITALIST

## 2025-08-05 PROCEDURE — 3044F HG A1C LEVEL LT 7.0%: CPT | Performed by: HOSPITALIST

## 2025-08-05 PROCEDURE — 4010F ACE/ARB THERAPY RXD/TAKEN: CPT | Performed by: HOSPITALIST

## 2025-08-05 PROCEDURE — 3074F SYST BP LT 130 MM HG: CPT | Performed by: HOSPITALIST

## 2025-08-05 PROCEDURE — 99214 OFFICE O/P EST MOD 30 MIN: CPT | Performed by: HOSPITALIST

## 2025-08-05 PROCEDURE — 3008F BODY MASS INDEX DOCD: CPT | Performed by: HOSPITALIST

## 2025-08-05 PROCEDURE — 82962 GLUCOSE BLOOD TEST: CPT | Performed by: HOSPITALIST

## 2025-08-05 PROCEDURE — 83036 HEMOGLOBIN GLYCOSYLATED A1C: CPT | Performed by: HOSPITALIST

## 2025-08-05 ASSESSMENT — ENCOUNTER SYMPTOMS
ABDOMINAL PAIN: 0
CHEST TIGHTNESS: 0
LIGHT-HEADEDNESS: 0
TREMORS: 0
NAUSEA: 0
SHORTNESS OF BREATH: 0
TROUBLE SWALLOWING: 0
NERVOUS/ANXIOUS: 0
ABDOMINAL DISTENTION: 0
EYE ITCHING: 0
CONSTIPATION: 0
VOICE CHANGE: 0
PALPITATIONS: 0
SLEEP DISTURBANCE: 0
VOMITING: 0
FREQUENCY: 0
AGITATION: 0
DYSURIA: 0
CONSTITUTIONAL NEGATIVE: 1
HEADACHES: 0
ARTHRALGIAS: 0
PHOTOPHOBIA: 0
DIARRHEA: 0
SORE THROAT: 0

## 2025-08-05 NOTE — PROGRESS NOTES
Subjective   Patient ID:  Patient ID: Allen Sierra is a 65 y.o. male who presents for Diabetes (Dm2 dx: 2008ish/PCP: [facility] /Podiatry: in [place] but might change /Eye exam: due /Pt testing glucose once daily, did not bring meter  Lab Results   Component Value Date    HGBA1C 6.3 08/05/2025      HPI   See AP     Review of Systems   Constitutional: Negative.    HENT:  Negative for sore throat, trouble swallowing and voice change.    Eyes:  Negative for photophobia, itching and visual disturbance.   Respiratory:  Negative for chest tightness and shortness of breath.    Cardiovascular:  Negative for chest pain and palpitations.   Gastrointestinal:  Negative for abdominal distention, abdominal pain, constipation, diarrhea, nausea and vomiting.   Endocrine: Negative for cold intolerance, heat intolerance and polyuria.   Genitourinary:  Negative for dysuria and frequency.   Musculoskeletal:  Negative for arthralgias.   Skin:  Negative for pallor.   Allergic/Immunologic: Negative for environmental allergies.   Neurological:  Negative for tremors, light-headedness and headaches.   Psychiatric/Behavioral:  Negative for agitation and sleep disturbance. The patient is not nervous/anxious.        Objective   Physical Exam  Constitutional:       Appearance: Normal appearance.   HENT:      Head: Normocephalic.      Nose: Nose normal.      Mouth/Throat:      Mouth: Mucous membranes are moist.     Eyes:      Extraocular Movements: Extraocular movements intact.       Cardiovascular:      Rate and Rhythm: Normal rate.   Pulmonary:      Effort: Pulmonary effort is normal. No respiratory distress.   Abdominal:      General: There is no distension.     Musculoskeletal:         General: Normal range of motion.      Cervical back: Normal range of motion and neck supple.     Skin:     General: Skin is warm and dry.     Neurological:      Mental Status: He is alert and oriented to person, place, and time.     Psychiatric:         Mood and  Affect: Mood normal.        Assessment/Plan   Diagnoses and all orders for this visit:  Type 2 diabetes mellitus with diabetic polyneuropathy, with long-term current use of insulin  -     Comprehensive metabolic panel; Future  -     Albumin-Creatinine Ratio, Urine Random; Future  -     Hemoglobin A1c; Future  -     Basaglar KwikPen U-100 Insulin 100 unit/mL (3 mL) pen; Inject 34 Units under the skin once daily at bedtime. Inject 32 Units under the skin once daily at bedtime.  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 Inject 32 Units under the skin once daily at bedtime.  Long term (current) use of insulin (Multi)  -     Basaglar KwikPen U-100 Insulin 100 unit/mL (3 mL) pen; Inject 34 Units under the skin once daily at bedtime. Inject 32 Units under the skin once daily at bedtime.            T2 DM , uncontrolled - s/p gastric bypass surgery ~ 11 yrs ago   A1c improved from before and just at goal      he has made sig changes in diet and trying to eat better , lost ~ 10 lbs.   Basaglar 0-0-0-34  Oxempic 1 mg  mg weekly ( started 4/ 2023 )- Lost 30 lbs on ozempic. Occ diarrhea/ constipation . Feels full a lot.   metformin 1000 BID    Jardiance 10 mg ( restarted July 2023 - no more UTI after that )   Glipizide 2.5 mg before dinner    JARDIANCE WAS DISCONTINUED IN JUNE 2023- given yeast infection and then UTI but restarted again in July 2023 after a CHF exacerbation   BG - checks once a day , morning BG          A1c improved after adding glipizide and now at goal   He was doing food journal before , not doing anymore    Eating more during and after holidays.      PLAN:   Discussed CGM not interested at this time  Advised to take 10 clicks left on Ozempic given occasional diarrhea and constipation  Continue basaglar to 34 units   Advised  to bring glucometer next visit  Discussed side effects of Jardiance, Ozempic  - will hold off increasing ozempic more , will be carefull given h/o gastric bypass- RYB  - discussed pancreatitis/ cholecystitis risk.   - continue metformin  -Discussed diabetic diet and diet modifications as a means to control diabetes  - LDL at goal, continue statin   - BP at goal , continue on ACEi      # Anemia-mentions was diagnosed with anemia and is going to get iron infusions  # Carpal tunnel syndrome?  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    Does swimming    2 dogs Merlin ( older) and Zues-  both are small dogs  -  resistance exercises , - water aerobics

## 2025-08-07 ENCOUNTER — INFUSION (OUTPATIENT)
Dept: HEMATOLOGY/ONCOLOGY | Facility: CLINIC | Age: 65
End: 2025-08-07
Payer: COMMERCIAL

## 2025-08-07 VITALS
OXYGEN SATURATION: 98 % | WEIGHT: 309.75 LBS | RESPIRATION RATE: 16 BRPM | HEART RATE: 60 BPM | DIASTOLIC BLOOD PRESSURE: 69 MMHG | BODY MASS INDEX: 43.2 KG/M2 | SYSTOLIC BLOOD PRESSURE: 120 MMHG | TEMPERATURE: 97 F

## 2025-08-07 DIAGNOSIS — D50.8 OTHER IRON DEFICIENCY ANEMIA: ICD-10-CM

## 2025-08-07 PROCEDURE — 2500000004 HC RX 250 GENERAL PHARMACY W/ HCPCS (ALT 636 FOR OP/ED)

## 2025-08-07 PROCEDURE — 96365 THER/PROPH/DIAG IV INF INIT: CPT | Mod: INF

## 2025-08-07 RX ORDER — EPINEPHRINE 0.3 MG/.3ML
0.3 INJECTION SUBCUTANEOUS EVERY 5 MIN PRN
Status: DISCONTINUED | OUTPATIENT
Start: 2025-08-07 | End: 2025-08-07 | Stop reason: HOSPADM

## 2025-08-07 RX ORDER — DIPHENHYDRAMINE HYDROCHLORIDE 50 MG/ML
50 INJECTION, SOLUTION INTRAMUSCULAR; INTRAVENOUS AS NEEDED
Status: DISCONTINUED | OUTPATIENT
Start: 2025-08-07 | End: 2025-08-07 | Stop reason: HOSPADM

## 2025-08-07 RX ORDER — ALBUTEROL SULFATE 0.83 MG/ML
3 SOLUTION RESPIRATORY (INHALATION) AS NEEDED
OUTPATIENT
Start: 2025-08-14

## 2025-08-07 RX ORDER — HEPARIN SODIUM,PORCINE/PF 10 UNIT/ML
50 SYRINGE (ML) INTRAVENOUS AS NEEDED
OUTPATIENT
Start: 2025-08-07

## 2025-08-07 RX ORDER — ALBUTEROL SULFATE 0.83 MG/ML
3 SOLUTION RESPIRATORY (INHALATION) AS NEEDED
Status: DISCONTINUED | OUTPATIENT
Start: 2025-08-07 | End: 2025-08-07 | Stop reason: HOSPADM

## 2025-08-07 RX ORDER — EPINEPHRINE 0.3 MG/.3ML
0.3 INJECTION SUBCUTANEOUS EVERY 5 MIN PRN
OUTPATIENT
Start: 2025-08-14

## 2025-08-07 RX ORDER — HEPARIN 100 UNIT/ML
500 SYRINGE INTRAVENOUS AS NEEDED
OUTPATIENT
Start: 2025-08-07

## 2025-08-07 RX ORDER — FAMOTIDINE 10 MG/ML
20 INJECTION, SOLUTION INTRAVENOUS ONCE AS NEEDED
Status: DISCONTINUED | OUTPATIENT
Start: 2025-08-07 | End: 2025-08-07 | Stop reason: HOSPADM

## 2025-08-07 RX ORDER — FAMOTIDINE 10 MG/ML
20 INJECTION, SOLUTION INTRAVENOUS ONCE AS NEEDED
OUTPATIENT
Start: 2025-08-14

## 2025-08-07 RX ORDER — DIPHENHYDRAMINE HYDROCHLORIDE 50 MG/ML
50 INJECTION, SOLUTION INTRAMUSCULAR; INTRAVENOUS AS NEEDED
OUTPATIENT
Start: 2025-08-14

## 2025-08-07 RX ADMIN — IRON SUCROSE 300 MG: 20 INJECTION, SOLUTION INTRAVENOUS at 09:28

## 2025-08-07 ASSESSMENT — PAIN SCALES - GENERAL: PAINLEVEL_OUTOF10: 0-NO PAIN

## 2025-08-14 ENCOUNTER — INFUSION (OUTPATIENT)
Dept: HEMATOLOGY/ONCOLOGY | Facility: CLINIC | Age: 65
End: 2025-08-14
Payer: COMMERCIAL

## 2025-08-14 VITALS
HEART RATE: 60 BPM | BODY MASS INDEX: 43.23 KG/M2 | WEIGHT: 309.97 LBS | OXYGEN SATURATION: 98 % | TEMPERATURE: 95.9 F | DIASTOLIC BLOOD PRESSURE: 68 MMHG | SYSTOLIC BLOOD PRESSURE: 121 MMHG | RESPIRATION RATE: 16 BRPM

## 2025-08-14 DIAGNOSIS — D50.8 OTHER IRON DEFICIENCY ANEMIA: ICD-10-CM

## 2025-08-14 PROCEDURE — 2500000004 HC RX 250 GENERAL PHARMACY W/ HCPCS (ALT 636 FOR OP/ED)

## 2025-08-14 PROCEDURE — 96365 THER/PROPH/DIAG IV INF INIT: CPT | Mod: INF

## 2025-08-14 RX ORDER — FAMOTIDINE 10 MG/ML
20 INJECTION, SOLUTION INTRAVENOUS ONCE AS NEEDED
Status: DISCONTINUED | OUTPATIENT
Start: 2025-08-14 | End: 2025-08-14 | Stop reason: HOSPADM

## 2025-08-14 RX ORDER — HEPARIN SODIUM,PORCINE/PF 10 UNIT/ML
50 SYRINGE (ML) INTRAVENOUS AS NEEDED
OUTPATIENT
Start: 2025-08-14

## 2025-08-14 RX ORDER — EPINEPHRINE 0.3 MG/.3ML
0.3 INJECTION SUBCUTANEOUS EVERY 5 MIN PRN
Status: DISCONTINUED | OUTPATIENT
Start: 2025-08-14 | End: 2025-08-14 | Stop reason: HOSPADM

## 2025-08-14 RX ORDER — ALBUTEROL SULFATE 0.83 MG/ML
3 SOLUTION RESPIRATORY (INHALATION) AS NEEDED
Status: DISCONTINUED | OUTPATIENT
Start: 2025-08-14 | End: 2025-08-14 | Stop reason: HOSPADM

## 2025-08-14 RX ORDER — EPINEPHRINE 0.3 MG/.3ML
0.3 INJECTION SUBCUTANEOUS EVERY 5 MIN PRN
OUTPATIENT
Start: 2025-08-21

## 2025-08-14 RX ORDER — HEPARIN 100 UNIT/ML
500 SYRINGE INTRAVENOUS AS NEEDED
OUTPATIENT
Start: 2025-08-14

## 2025-08-14 RX ORDER — DIPHENHYDRAMINE HYDROCHLORIDE 50 MG/ML
50 INJECTION, SOLUTION INTRAMUSCULAR; INTRAVENOUS AS NEEDED
OUTPATIENT
Start: 2025-08-21

## 2025-08-14 RX ORDER — ALBUTEROL SULFATE 0.83 MG/ML
3 SOLUTION RESPIRATORY (INHALATION) AS NEEDED
OUTPATIENT
Start: 2025-08-21

## 2025-08-14 RX ORDER — DIPHENHYDRAMINE HYDROCHLORIDE 50 MG/ML
50 INJECTION, SOLUTION INTRAMUSCULAR; INTRAVENOUS AS NEEDED
Status: DISCONTINUED | OUTPATIENT
Start: 2025-08-14 | End: 2025-08-14 | Stop reason: HOSPADM

## 2025-08-14 RX ORDER — FAMOTIDINE 10 MG/ML
20 INJECTION, SOLUTION INTRAVENOUS ONCE AS NEEDED
OUTPATIENT
Start: 2025-08-21

## 2025-08-14 RX ADMIN — IRON SUCROSE 300 MG: 20 INJECTION, SOLUTION INTRAVENOUS at 09:01

## 2025-08-14 ASSESSMENT — PAIN SCALES - GENERAL
PAINLEVEL_OUTOF10: 0-NO PAIN

## 2025-08-21 ENCOUNTER — INFUSION (OUTPATIENT)
Dept: HEMATOLOGY/ONCOLOGY | Facility: CLINIC | Age: 65
End: 2025-08-21
Payer: COMMERCIAL

## 2025-08-21 VITALS
DIASTOLIC BLOOD PRESSURE: 63 MMHG | OXYGEN SATURATION: 97 % | BODY MASS INDEX: 43.69 KG/M2 | HEART RATE: 59 BPM | WEIGHT: 313.27 LBS | SYSTOLIC BLOOD PRESSURE: 114 MMHG | RESPIRATION RATE: 18 BRPM | TEMPERATURE: 97.3 F

## 2025-08-21 DIAGNOSIS — D50.8 OTHER IRON DEFICIENCY ANEMIA: ICD-10-CM

## 2025-08-21 PROCEDURE — 96365 THER/PROPH/DIAG IV INF INIT: CPT | Mod: INF

## 2025-08-21 PROCEDURE — 2500000004 HC RX 250 GENERAL PHARMACY W/ HCPCS (ALT 636 FOR OP/ED)

## 2025-08-21 RX ORDER — EPINEPHRINE 0.3 MG/.3ML
0.3 INJECTION SUBCUTANEOUS EVERY 5 MIN PRN
OUTPATIENT
Start: 2025-08-21

## 2025-08-21 RX ORDER — FAMOTIDINE 10 MG/ML
20 INJECTION, SOLUTION INTRAVENOUS ONCE AS NEEDED
OUTPATIENT
Start: 2025-08-21

## 2025-08-21 RX ORDER — HEPARIN SODIUM,PORCINE/PF 10 UNIT/ML
50 SYRINGE (ML) INTRAVENOUS AS NEEDED
OUTPATIENT
Start: 2025-08-21

## 2025-08-21 RX ORDER — HEPARIN 100 UNIT/ML
500 SYRINGE INTRAVENOUS AS NEEDED
Status: DISCONTINUED | OUTPATIENT
Start: 2025-08-21 | End: 2025-08-21 | Stop reason: HOSPADM

## 2025-08-21 RX ORDER — ALBUTEROL SULFATE 0.83 MG/ML
3 SOLUTION RESPIRATORY (INHALATION) AS NEEDED
OUTPATIENT
Start: 2025-08-21

## 2025-08-21 RX ORDER — DIPHENHYDRAMINE HYDROCHLORIDE 50 MG/ML
50 INJECTION, SOLUTION INTRAMUSCULAR; INTRAVENOUS AS NEEDED
OUTPATIENT
Start: 2025-08-21

## 2025-08-21 RX ORDER — HEPARIN 100 UNIT/ML
500 SYRINGE INTRAVENOUS AS NEEDED
OUTPATIENT
Start: 2025-08-21

## 2025-08-21 RX ORDER — HEPARIN SODIUM,PORCINE/PF 10 UNIT/ML
50 SYRINGE (ML) INTRAVENOUS AS NEEDED
Status: DISCONTINUED | OUTPATIENT
Start: 2025-08-21 | End: 2025-08-21 | Stop reason: HOSPADM

## 2025-08-21 RX ADMIN — IRON SUCROSE 300 MG: 20 INJECTION, SOLUTION INTRAVENOUS at 09:05

## 2025-08-21 ASSESSMENT — PAIN SCALES - GENERAL: PAINLEVEL_OUTOF10: 0-NO PAIN

## 2025-09-01 DIAGNOSIS — E11.42 TYPE 2 DIABETES MELLITUS WITH DIABETIC POLYNEUROPATHY, WITH LONG-TERM CURRENT USE OF INSULIN: ICD-10-CM

## 2025-09-01 DIAGNOSIS — Z79.4 TYPE 2 DIABETES MELLITUS WITH DIABETIC POLYNEUROPATHY, WITH LONG-TERM CURRENT USE OF INSULIN: ICD-10-CM

## 2025-09-02 RX ORDER — SEMAGLUTIDE 1.34 MG/ML
INJECTION, SOLUTION SUBCUTANEOUS
Qty: 9 ML | Refills: 1 | Status: SHIPPED | OUTPATIENT
Start: 2025-09-02

## 2026-01-16 ENCOUNTER — APPOINTMENT (OUTPATIENT)
Dept: ENDOCRINOLOGY | Facility: CLINIC | Age: 66
End: 2026-01-16
Payer: COMMERCIAL

## 2026-01-23 ENCOUNTER — APPOINTMENT (OUTPATIENT)
Dept: ENDOCRINOLOGY | Facility: CLINIC | Age: 66
End: 2026-01-23
Payer: COMMERCIAL

## 2026-04-14 ENCOUNTER — APPOINTMENT (OUTPATIENT)
Facility: CLINIC | Age: 66
End: 2026-04-14
Payer: COMMERCIAL

## 2026-07-28 ENCOUNTER — APPOINTMENT (OUTPATIENT)
Dept: ENDOCRINOLOGY | Facility: CLINIC | Age: 66
End: 2026-07-28
Payer: COMMERCIAL